# Patient Record
Sex: MALE | Race: WHITE | NOT HISPANIC OR LATINO | URBAN - METROPOLITAN AREA
[De-identification: names, ages, dates, MRNs, and addresses within clinical notes are randomized per-mention and may not be internally consistent; named-entity substitution may affect disease eponyms.]

---

## 2012-02-13 LAB
CHOLEST SERPL-MCNC: 135 MG/DL
HDLC SERPL-MCNC: 34 MG/DL
LDLC SERPL CALC-MCNC: 77 MG/DL
TRIGL SERPL-MCNC: 119 MG/DL

## 2017-01-01 ENCOUNTER — OFFICE VISIT - HEALTHEAST (OUTPATIENT)
Dept: GERIATRICS | Facility: CLINIC | Age: 82
End: 2017-01-01

## 2017-01-01 ENCOUNTER — AMBULATORY - HEALTHEAST (OUTPATIENT)
Dept: GERIATRICS | Facility: CLINIC | Age: 82
End: 2017-01-01

## 2017-01-01 DIAGNOSIS — I50.22 CHRONIC SYSTOLIC CONGESTIVE HEART FAILURE (H): ICD-10-CM

## 2017-01-01 DIAGNOSIS — R53.81 PHYSICAL DECONDITIONING: ICD-10-CM

## 2017-01-01 DIAGNOSIS — L20.9 ATOPIC DERMATITIS: ICD-10-CM

## 2017-01-01 DIAGNOSIS — R60.0 PEDAL EDEMA: ICD-10-CM

## 2017-01-01 DIAGNOSIS — K26.9 DUODENAL ULCER: ICD-10-CM

## 2017-01-01 DIAGNOSIS — N17.9 AKI (ACUTE KIDNEY INJURY) (H): ICD-10-CM

## 2017-01-01 DIAGNOSIS — K26.9 MULTIPLE DUODENAL ULCERS: ICD-10-CM

## 2017-01-01 DIAGNOSIS — G93.40 ENCEPHALOPATHY: ICD-10-CM

## 2017-01-01 DIAGNOSIS — R53.1 WEAKNESS: ICD-10-CM

## 2017-01-01 DIAGNOSIS — I48.0 PAROXYSMAL ATRIAL FIBRILLATION (H): ICD-10-CM

## 2017-01-01 DIAGNOSIS — M75.01 ADHESIVE CAPSULITIS OF RIGHT SHOULDER: ICD-10-CM

## 2017-01-03 ENCOUNTER — OFFICE VISIT - HEALTHEAST (OUTPATIENT)
Dept: GERIATRICS | Facility: CLINIC | Age: 82
End: 2017-01-03

## 2017-01-03 DIAGNOSIS — I48.0 PAROXYSMAL ATRIAL FIBRILLATION (H): ICD-10-CM

## 2017-01-03 DIAGNOSIS — K56.0 ADYNAMIC ILEUS (H): ICD-10-CM

## 2017-01-03 DIAGNOSIS — R19.7 DIARRHEA, UNSPECIFIED TYPE: ICD-10-CM

## 2017-01-03 DIAGNOSIS — R53.81 PHYSICAL DECONDITIONING: ICD-10-CM

## 2017-01-03 DIAGNOSIS — L20.9 ATOPIC DERMATITIS: ICD-10-CM

## 2017-01-05 ENCOUNTER — AMBULATORY - HEALTHEAST (OUTPATIENT)
Dept: GERIATRICS | Facility: CLINIC | Age: 82
End: 2017-01-05

## 2017-01-10 ENCOUNTER — OFFICE VISIT - HEALTHEAST (OUTPATIENT)
Dept: GERIATRICS | Facility: CLINIC | Age: 82
End: 2017-01-10

## 2017-01-10 DIAGNOSIS — R53.1 WEAKNESS: ICD-10-CM

## 2017-01-10 DIAGNOSIS — J10.1 INFLUENZA A: ICD-10-CM
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
## 2017-01-10 DIAGNOSIS — S50.311A ABRASION OF RIGHT ELBOW: ICD-10-CM

## 2017-01-10 DIAGNOSIS — I48.19 PERSISTENT ATRIAL FIBRILLATION (H): ICD-10-CM

## 2017-01-10 DIAGNOSIS — E83.42 HYPOMAGNESEMIA: ICD-10-CM

## 2017-01-10 DIAGNOSIS — R53.81 PHYSICAL DECONDITIONING: ICD-10-CM

## 2017-01-10 DIAGNOSIS — S50.312A ABRASION OF LEFT ELBOW: ICD-10-CM

## 2017-01-10 DIAGNOSIS — Z86.73 HISTORY OF CVA (CEREBROVASCULAR ACCIDENT): ICD-10-CM

## 2017-01-10 ASSESSMENT — MIFFLIN-ST. JEOR: SCORE: 1481.07

## 2017-01-13 ENCOUNTER — OFFICE VISIT - HEALTHEAST (OUTPATIENT)
Dept: GERIATRICS | Facility: CLINIC | Age: 82
End: 2017-01-13

## 2017-01-13 DIAGNOSIS — R53.1 WEAKNESS: ICD-10-CM

## 2017-01-13 DIAGNOSIS — S50.311D ABRASION OF RIGHT ELBOW, SUBSEQUENT ENCOUNTER: ICD-10-CM

## 2017-01-13 DIAGNOSIS — J10.1 INFLUENZA A: ICD-10-CM

## 2017-01-13 DIAGNOSIS — R63.0 POOR APPETITE: ICD-10-CM

## 2017-01-13 DIAGNOSIS — S50.312A ABRASION OF LEFT ELBOW: ICD-10-CM

## 2017-01-20 ENCOUNTER — OFFICE VISIT - HEALTHEAST (OUTPATIENT)
Dept: GERIATRICS | Facility: CLINIC | Age: 82
End: 2017-01-20

## 2017-01-20 DIAGNOSIS — S50.311D ABRASION OF RIGHT ELBOW, SUBSEQUENT ENCOUNTER: ICD-10-CM

## 2017-01-20 DIAGNOSIS — R53.81 PHYSICAL DECONDITIONING: ICD-10-CM

## 2017-01-20 DIAGNOSIS — J10.1 INFLUENZA A: ICD-10-CM

## 2017-01-24 ENCOUNTER — OFFICE VISIT - HEALTHEAST (OUTPATIENT)
Dept: GERIATRICS | Facility: CLINIC | Age: 82
End: 2017-01-24

## 2017-01-24 DIAGNOSIS — I48.0 PAROXYSMAL ATRIAL FIBRILLATION (H): ICD-10-CM

## 2017-01-24 DIAGNOSIS — I95.9 HYPOTENSION: ICD-10-CM

## 2017-01-24 DIAGNOSIS — S50.311D ABRASION OF RIGHT ELBOW, SUBSEQUENT ENCOUNTER: ICD-10-CM

## 2017-01-24 DIAGNOSIS — L20.9 ATOPIC DERMATITIS: ICD-10-CM

## 2017-01-24 DIAGNOSIS — R53.1 WEAKNESS: ICD-10-CM

## 2017-01-27 ENCOUNTER — OFFICE VISIT - HEALTHEAST (OUTPATIENT)
Dept: GERIATRICS | Facility: CLINIC | Age: 82
End: 2017-01-27

## 2017-01-27 DIAGNOSIS — I95.9 HYPOTENSION: ICD-10-CM

## 2017-01-27 DIAGNOSIS — I48.19 PERSISTENT ATRIAL FIBRILLATION (H): ICD-10-CM

## 2017-01-27 DIAGNOSIS — R00.0 TACHYCARDIA: ICD-10-CM

## 2017-01-31 ENCOUNTER — OFFICE VISIT - HEALTHEAST (OUTPATIENT)
Dept: GERIATRICS | Facility: CLINIC | Age: 82
End: 2017-01-31

## 2017-01-31 DIAGNOSIS — R53.1 WEAKNESS: ICD-10-CM

## 2017-01-31 DIAGNOSIS — S50.312S: ICD-10-CM

## 2017-01-31 DIAGNOSIS — R00.0 TACHYCARDIA: ICD-10-CM

## 2017-01-31 DIAGNOSIS — I95.9 HYPOTENSION: ICD-10-CM

## 2017-01-31 DIAGNOSIS — L20.9 ATOPIC DERMATITIS: ICD-10-CM

## 2017-01-31 DIAGNOSIS — I48.19 PERSISTENT ATRIAL FIBRILLATION (H): ICD-10-CM

## 2017-01-31 DIAGNOSIS — R53.81 PHYSICAL DECONDITIONING: ICD-10-CM

## 2017-01-31 DIAGNOSIS — S50.311D ABRASION OF RIGHT ELBOW, SUBSEQUENT ENCOUNTER: ICD-10-CM

## 2017-02-02 ENCOUNTER — AMBULATORY - HEALTHEAST (OUTPATIENT)
Dept: GERIATRICS | Facility: CLINIC | Age: 82
End: 2017-02-02

## 2017-08-11 ENCOUNTER — SURGERY - HEALTHEAST (OUTPATIENT)
Dept: GASTROENTEROLOGY | Facility: CLINIC | Age: 82
End: 2017-08-11

## 2017-08-11 ASSESSMENT — MIFFLIN-ST. JEOR
SCORE: 1455.92
SCORE: 1469.53

## 2017-08-13 ASSESSMENT — MIFFLIN-ST. JEOR: SCORE: 1441.41

## 2017-08-14 ASSESSMENT — MIFFLIN-ST. JEOR: SCORE: 1435.69

## 2018-01-01 ENCOUNTER — HOME CARE/HOSPICE - HEALTHEAST (OUTPATIENT)
Dept: HOSPICE | Facility: HOSPICE | Age: 83
End: 2018-01-01

## 2018-01-01 ENCOUNTER — AMBULATORY - HEALTHEAST (OUTPATIENT)
Dept: OTHER | Facility: CLINIC | Age: 83
End: 2018-01-01

## 2018-01-01 ENCOUNTER — AMBULATORY - HEALTHEAST (OUTPATIENT)
Dept: HOSPICE | Facility: HOSPICE | Age: 83
End: 2018-01-01

## 2018-01-01 ENCOUNTER — OFFICE VISIT - HEALTHEAST (OUTPATIENT)
Dept: GERIATRICS | Facility: CLINIC | Age: 83
End: 2018-01-01

## 2018-01-01 ENCOUNTER — AMBULATORY - HEALTHEAST (OUTPATIENT)
Dept: FAMILY MEDICINE | Facility: CLINIC | Age: 83
End: 2018-01-01

## 2018-01-01 DIAGNOSIS — N28.89 LEFT RENAL MASS: ICD-10-CM

## 2018-01-01 DIAGNOSIS — L20.9 ATOPIC DERMATITIS: ICD-10-CM

## 2018-01-01 DIAGNOSIS — N40.0 PROSTATIC HYPERTROPHY: ICD-10-CM

## 2018-01-01 DIAGNOSIS — R60.0 PEDAL EDEMA: ICD-10-CM

## 2018-01-01 DIAGNOSIS — G47.00 INSOMNIA: ICD-10-CM

## 2018-01-01 DIAGNOSIS — C64.2 RENAL CELL CARCINOMA OF LEFT KIDNEY METASTATIC TO OTHER SITE (H): ICD-10-CM

## 2018-01-01 DIAGNOSIS — I95.9 HYPOTENSION: ICD-10-CM

## 2018-01-01 DIAGNOSIS — I63.40 CEREBROVASCULAR ACCIDENT (CVA) DUE TO EMBOLISM OF CEREBRAL ARTERY (H): ICD-10-CM

## 2018-01-01 DIAGNOSIS — E46 PROTEIN CALORIE MALNUTRITION (H): ICD-10-CM

## 2018-01-01 DIAGNOSIS — R53.1 GENERALIZED WEAKNESS: ICD-10-CM

## 2018-01-01 DIAGNOSIS — G62.9 NEUROPATHY: ICD-10-CM

## 2018-01-01 DIAGNOSIS — L29.9 PRURITUS: ICD-10-CM

## 2018-01-01 DIAGNOSIS — N13.30 HYDRONEPHROSIS, LEFT: ICD-10-CM

## 2018-01-01 DIAGNOSIS — N28.89 RENAL MASS: ICD-10-CM

## 2018-01-01 DIAGNOSIS — R11.0 NAUSEA: ICD-10-CM

## 2018-01-01 DIAGNOSIS — Z51.5 HOSPICE CARE: ICD-10-CM

## 2018-01-01 DIAGNOSIS — E44.0 MODERATE PROTEIN-CALORIE MALNUTRITION (H): ICD-10-CM

## 2018-01-01 DIAGNOSIS — R31.9 HEMATURIA: ICD-10-CM

## 2018-01-01 DIAGNOSIS — I48.0 PAROXYSMAL ATRIAL FIBRILLATION (H): ICD-10-CM

## 2018-01-01 RX ORDER — ATROPINE SULFATE 10 MG/ML
2 SOLUTION/ DROPS OPHTHALMIC EVERY 4 HOURS
Status: SHIPPED | COMMUNITY
Start: 2018-01-01

## 2018-01-01 RX ORDER — HYDROMORPHONE HYDROCHLORIDE 1 MG/ML
2 SOLUTION ORAL
Status: SHIPPED | COMMUNITY
Start: 2018-01-01

## 2018-01-01 RX ORDER — ATROPINE SULFATE 10 MG/ML
2 SOLUTION/ DROPS OPHTHALMIC
Status: SHIPPED | COMMUNITY
Start: 2018-01-01

## 2018-01-01 RX ORDER — HALOPERIDOL 2 MG/ML
0.5 SOLUTION ORAL EVERY 4 HOURS PRN
Status: SHIPPED | COMMUNITY
Start: 2018-01-01

## 2018-01-01 RX ORDER — LORAZEPAM 2 MG/ML
0.5 CONCENTRATE ORAL EVERY 4 HOURS PRN
Status: SHIPPED | COMMUNITY
Start: 2018-01-01

## 2018-01-01 RX ORDER — LORAZEPAM 2 MG/ML
0.5 CONCENTRATE ORAL EVERY 4 HOURS
Status: SHIPPED | COMMUNITY
Start: 2018-01-01

## 2018-01-01 RX ORDER — HYDROMORPHONE HYDROCHLORIDE 1 MG/ML
2 SOLUTION ORAL EVERY 4 HOURS
Status: SHIPPED | COMMUNITY
Start: 2018-01-01

## 2018-01-01 RX ORDER — HALOPERIDOL 2 MG/ML
0.5 SOLUTION ORAL EVERY 4 HOURS
Status: SHIPPED | COMMUNITY
Start: 2018-01-01

## 2018-01-01 ASSESSMENT — MIFFLIN-ST. JEOR
SCORE: 1365.21
SCORE: 1455.02

## 2018-08-20 ENCOUNTER — AMBULATORY - HEALTHEAST (OUTPATIENT)
Dept: GERIATRICS | Facility: CLINIC | Age: 83
End: 2018-08-20

## 2021-05-30 VITALS — BODY MASS INDEX: 24.39 KG/M2 | WEIGHT: 174.9 LBS

## 2021-05-30 VITALS — BODY MASS INDEX: 24 KG/M2 | WEIGHT: 172 LBS

## 2021-05-30 VITALS — WEIGHT: 180.6 LBS | BODY MASS INDEX: 25.28 KG/M2 | HEIGHT: 71 IN

## 2021-05-30 VITALS — WEIGHT: 172.2 LBS | BODY MASS INDEX: 24.03 KG/M2

## 2021-05-30 VITALS — WEIGHT: 170.8 LBS | BODY MASS INDEX: 23.83 KG/M2

## 2021-05-30 VITALS — WEIGHT: 169.8 LBS | BODY MASS INDEX: 23.69 KG/M2

## 2021-05-30 VITALS — WEIGHT: 172.4 LBS | BODY MASS INDEX: 24.06 KG/M2

## 2021-05-31 VITALS — WEIGHT: 176.8 LBS | BODY MASS INDEX: 24.66 KG/M2

## 2021-05-31 VITALS — WEIGHT: 170.54 LBS | BODY MASS INDEX: 23.88 KG/M2 | HEIGHT: 71 IN

## 2021-06-01 VITALS — BODY MASS INDEX: 20.57 KG/M2 | WEIGHT: 155.9 LBS

## 2021-06-01 VITALS — BODY MASS INDEX: 21.51 KG/M2 | WEIGHT: 163 LBS

## 2021-06-01 VITALS — WEIGHT: 153.9 LBS | BODY MASS INDEX: 20.3 KG/M2

## 2021-06-01 VITALS — BODY MASS INDEX: 19.79 KG/M2 | WEIGHT: 150 LBS

## 2021-06-01 VITALS — WEIGHT: 152 LBS | BODY MASS INDEX: 20.05 KG/M2

## 2021-06-01 VITALS — BODY MASS INDEX: 21.7 KG/M2 | WEIGHT: 155 LBS | HEIGHT: 71 IN

## 2021-06-01 VITALS — BODY MASS INDEX: 19.92 KG/M2 | WEIGHT: 151 LBS

## 2021-06-01 VITALS — WEIGHT: 159 LBS | BODY MASS INDEX: 20.98 KG/M2

## 2021-06-01 VITALS — HEIGHT: 73 IN | BODY MASS INDEX: 22.24 KG/M2 | WEIGHT: 167.8 LBS

## 2021-06-01 VITALS — BODY MASS INDEX: 20.3 KG/M2 | WEIGHT: 153.9 LBS

## 2021-06-01 VITALS — BODY MASS INDEX: 20.98 KG/M2 | WEIGHT: 159 LBS

## 2021-06-01 VITALS — BODY MASS INDEX: 21.64 KG/M2 | WEIGHT: 164 LBS

## 2021-06-01 VITALS — WEIGHT: 154 LBS | BODY MASS INDEX: 20.32 KG/M2

## 2021-06-03 ENCOUNTER — RECORDS - HEALTHEAST (OUTPATIENT)
Dept: ADMINISTRATIVE | Facility: CLINIC | Age: 86
End: 2021-06-03

## 2021-06-08 NOTE — PROGRESS NOTES
Code Status:  FULL CODE  Visit Type: Discharge Summary     Facility:  [facility] [[id number]]          PCP:  Abhijeet Dale MD  [phone]       Admission Date to our Facility: January 8, 2017 from [facility]  Discharge Date from our Facility: February 1, 2017 to [facility]    Discharge Diagnosis:    1. Persistent atrial fibrillation     2. Hypotension     3. Tachycardia     4. Weakness     5. Atopic dermatitis     6. Physical deconditioning     7. Abrasion of right elbow, subsequent encounter     8. Abrasion of left elbow, sequela          History of Present Illness: Ethan Martin is a 89 y.o. male     Skilled Nursing Facility Course:  Patient came to us after a influenza exposure and a series of several admissions to the hospital than TCU then readmissions which started on December 26 through the 28th for weakness and diarrhea and then to [facility] for small bowel obstruction slightly before that the 21st through the 24th. He then went home with some physical deconditioning on January 4 from our facility and then fell and presented to [facility] where he was found to have influence and was quite weak.    Facility course patient initially was quite weak and slowly developed some improvement in ambulatory abilities but then had a fall and engendered to lacerations on his elbows left and right respectively. In addition to this on the 24th he was discovered to have persistent hypotension and because of this we reduced his rather Prill from 10 mg to 5 mg. He then had persistent low blood pressure and required holding  his metoprolol which was ultimately resumed the 25 mg BID. This plus the digoxin working together stabilized both his heart rate and his blood pressure. At the time of discharge she is ambulating independently and his been through therapy OT and PT for 4 to 5 days. He's in the process of moving tomorrow to [facility] for which he is quite  excited.    Discharge Medications:  Aspirin 81 mg Q daily Excedrin one Q8 PRN rarely uses, for migraine headache Lipitor 10 mg QHS for cholesterol Benadryl 25 at HS for sleep discontinue the cough syrup, digoxin 250  g one Q0 day, Flomax .4 mg Q day, metoprolol 25 BID, Prilosec 20 BID, DC the Reglan, triamcinolone 0.1% to rash to times a day, been a cream to ration arms topically BID. Mepilex dressings to his abrasion's Q day    Discharge Plan:  DC to [facility] February 1 with Gene set up. Follow up with Dr. Dale in two weeks. Discontinue Reglan and, quite often is in/dextromethorphan. Other meds as written. Obtain a BMP ditch level and lipid profile fasting in two weeks    Review of Systems     Physical Exam   Constitutional:   Patient in much better spirits ambulating independently clearly much stronger than prior. Blood pressure review has significantly improved with no blood pressure decreases below 120 since discontinuing the NANCY approach. Heart rates have also stabilized firmly in the 70s and 80s with no increases into the hundreds with the current regimen.   Pulmonary/Chest: Effort normal and breath sounds normal.   Abdominal: Soft.   Skin:   Patient examined on the elbows with the left having one on the radio side almost completely healed over and dry on the underside there's an area that's oblong approximately 2 cm x 3 cm that is open and slightly weepy clear clean with good margins. Right elbow has one on the inner aspect as well this one is larger proxy 3.5 x 3.5 and is slightly more weepy.   Nursing note and vitals reviewed.      Labs:   Lab Results   Component Value Date    CHOL 209 (H) 08/04/2016    CHOL 156 03/03/2016    CHOL 135 02/13/2012     Lab Results   Component Value Date    HDL 31 (L) 08/04/2016    HDL 39 (L) 03/03/2016    HDL 34 (L) 02/13/2012     Lab Results   Component Value Date    LDLCALC  08/04/2016      Comment:      Invalid, Triglycerides >400    LDLCALC 81 03/03/2016     LDLCALC 77 02/13/2012     Lab Results   Component Value Date    TRIG 441 (H) 08/04/2016    TRIG 178 (H) 03/03/2016    TRIG 285 (H) 09/15/2013     Results for orders placed or performed in visit on 01/09/17   Basic Metabolic Panel   Result Value Ref Range    Sodium 134 (L) 136 - 145 mmol/L    Potassium 4.1 3.5 - 5.0 mmol/L    Chloride 108 (H) 98 - 107 mmol/L    CO2 20 (L) 22 - 31 mmol/L    Anion Gap, Calculation 6 5 - 18 mmol/L    Glucose 93 70 - 125 mg/dL    Calcium 8.3 (L) 8.5 - 10.5 mg/dL    BUN 8 8 - 28 mg/dL    Creatinine 0.64 (L) 0.70 - 1.30 mg/dL    GFR MDRD Af Amer >60 >60 mL/min/1.73m2    GFR MDRD Non Af Amer >60 >60 mL/min/1.73m2       Lab Results   Component Value Date    DIGOXIN 0.4 (L) 12/21/2016           Assessment:  1. Persistent atrial fibrillation     2. Hypotension     3. Tachycardia     4. Weakness     5. Atopic dermatitis     6. Physical deconditioning     7. Abrasion of right elbow, subsequent encounter     8. Abrasion of left elbow, sequela                   45 minutes total time of which 65% was in face to face communication with patient about above plan of care.    Electronically signed by: Elio Chowdhury MD

## 2021-06-08 NOTE — PROGRESS NOTES
Code Status:  FULL CODE  Visit Type: Problem Visit (htn,tachy)     Facility:  [facility] [[id number]]        Facility Type: SNF (Skilled Nursing Facility, TCU)    History of Present Illness: Ethan Martin is a 89 y.o. male Who has a known history of atrial fibrillation, tachycardia, blood pressure irregularity. We had recently discontinued his NANCY appraisal with no on toward affect to his blood pressure. Unfortunately at times his heart rate has gone up. He went through. Where it was normal but our most recent finding indeed shows an uptick. He states that he doesn't think it's associated with anger anxiousness etc. He did feel a little weaker this morning and I little confused.    Review of Systems     Physical Exam   Cardiovascular:   Patient blood pressures are quite labile from 120s to 150s systolic. Additionally his heart rate is equally irregular and at times rapid despite digit and metoprolol.   Pulmonary/Chest: Effort normal and breath sounds normal.   Neurological:   He appears that his baseline for ambulatory activities and I clarified this with nursing as well   Psychiatric:   Appears slightly more confused this morning and then upset that we have to explain things several times for him to understand it.   Nursing note and vitals reviewed.      Labs:  All labs reviewed in the nursing home record.    Assessment:  1. Persistent atrial fibrillation     2. Hypotension     3. Tachycardia         Plan: We had a long discussion about this in her options. I do feel with his age that his blood pressure can float upTo systolic 150 without any undue harm and greater risk would be for it to be too low. However his heart rate ideally should be less than 90. We've introduced the digoxin and the Toprol and at this juncture the digoxin is at its Max dose. Because of all these things we are going to observe him with serial heart rates and blood pressures and if he continues to have tachycardia with  the blood pressure at least greater than 125 we will do a small dose increase on his metoprolol.      25 minutes spent of which greater than 65% was face to face communication with the patient about above plan of care    Electronically signed by: Elio Chowdhury MD

## 2021-06-08 NOTE — PROGRESS NOTES
Code Status:  FULL CODE  Visit Type: Problem Visit (htn,tacy,weakness)     Facility:  [facility] [[id number]]        Facility Type: SNF (Skilled Nursing Facility, TCU)    History of Present Illness: Ethan Martin is a 89 y.o. male Who is doing quite well in his rehabilitation. His ambulating without an assistive device in his gait speed has increased significantly. He is ready for the move to assisted living. He still is at times impulsive. He said no complaints. He does have numerous questions about whether he can maintain his own provider when he goes to our assisted living and what kind of backup services he would have. His daughter is hearing asks questions about his current medical regimen    Review of Systems     Physical Exam   Constitutional:   Patient clearly in good spirits and appears to be back to his normal baseline.   Cardiovascular: Normal rate.    Patient's blood pressure has been quite low some systolic's in the 80s and 90s and the most recent 105 to 110. Heart rate however is been well controlled in the 60s and 70s for the most part. He said no dizziness with this site. When I get him to sit up and listen he is not dizzy whatsoever.   Pulmonary/Chest: Effort normal and breath sounds normal.   Musculoskeletal: He exhibits no edema.   Nursing note and vitals reviewed.      Labs:  All labs reviewed in the nursing home record.    Assessment:  1. Weakness     2. Abrasion of right elbow, subsequent encounter     3. Paroxysmal atrial fibrillation     4. Atopic dermatitis     5. Hypotension         Plan: We carefully reviewed his medicines and discussed the protocol. When he goes to assisted living he certainly can continue with his regular provider. Additionally if you would request us we would be happy to see him as well. We reviewed his low blood pressure findings and we are going to reduce his RAMapril to 5 mg Q daily from 10 mg in additionally do BID BP and heart rates notifying  us of the systolic is lesson 110 greater than 160 in the heart rate is greater than 95 or less than 60.Make available with refills his skin creams      25 minutes spent of which greater than 65% was face to face communication with the patient about above plan of care    Electronically signed by: Elio Chowdhury MD

## 2021-06-08 NOTE — PROGRESS NOTES
Code Status:  DNR/DNI  Visit Type: Problem Visit (skin lacer,appetite,acp)     Facility:  [facility] [[id number]]        Facility Type: SNF (Skilled Nursing Facility, TCU)    History of Present Illness: Ethan Martin is a 89 y.o. male Who I was asked to see today to follow up on progress. This gentleman his hand in several recent re-admissions due to inability to be at home because of various problems. The most recent was developing influenza and profound weakness. Since being here he is definitely improved to walk 400 feet he's doing a little bit more on his own and as of today his attitude is certainly better to about his overall dispensation. His also been giving some deep thought into the future habitation and he has acquiesce to his children's request to consider assisted living. He is looking at our apartments here [facility]. He still feels that his appetite is not what it should be in his energy is still a little bit off. He feels weak even though he is accomplishing the tasks.    Review of Systems     Physical Exam   Constitutional:   Patient attitude appears much more upbeat he's alert interactive less confronting.Vital signs in review peer to be normal his weight is only drifted down to pounds.   Pulmonary/Chest: Effort normal and breath sounds normal.   Musculoskeletal: Normal range of motion. He exhibits no edema.   Neurological: He is alert.   Skin:   The laceration on his right inner elbow is draining much less and is smaller in size. The skin is now only about 3 cm round that is all open. Left side is significantly better and is dry. He still has very thin skin that easily bruises up.   Psychiatric:   [place] cognitive assessment 30/30   Vitals reviewed.      Labs:  All labs reviewed in the nursing home record.    Assessment:  1. Influenza A     2. Abrasion of right elbow, subsequent encounter     3. Abrasion of left elbow     4. Weakness     5. Poor appetite         Plan: We  had a long discussion on assisted living and on his significantly extraordinary [place] cognitive assessment score of 30/30. We discussed that sometimes because he's thinking so clearly that he gets impatient and that this is to his frustration. He acknowledged that significantly. For his lack of appetite we will introduce boost I one can BID for supplementation. He will continue to work with his family about looking into the assisted living. We do wanting to get significantly stronger prior to discharge. All this discussed.      25 minutes spent of which greater than 65% was face to face communication with the patient about above plan of care    Electronically signed by: Elio Chowdhury MD

## 2021-06-08 NOTE — PROGRESS NOTES
Code Status: FULL  Visit Type: Problem Visit (independent mobility)     Facility:  [facility] [[id number]]        Facility Type: SNF (Skilled Nursing Facility, TCU)    History of Present Illness: Ethan Martin is a 89 y.o. male     Review of Systems     Physical Exam   Constitutional:   Patient is in better spirits more interactive less negative more positive in his comments. Lungs are clear hard his regular rate there's no edema vital signs are normal.   Vitals reviewed.      Labs:  All labs reviewed in the nursing home record.    Assessment:  1. Influenza A     2. Abrasion of right elbow, subsequent encounter     3. Physical deconditioning         Plan: We visited about his changes that he's going to be going through and he seems to be ready for them. He would like to have some of his exercise devices over in the apartments and we will make arrangements for that to happen. Will also make sure that he can have available to him the right equipment to continue improving his balance and independence. No other medical changes at this time      15 minutes spent of which greater than 65% was face to face communication with the patient about above plan of care    Electronically signed by: Elio Chowdhury MD

## 2021-06-08 NOTE — PROGRESS NOTES
Code Status:  FULL CODE  Visit Type: Discharge Summary     Facility:  [facility] [[id number]]          PCP:  Abhijeet Dael MD  [phone]       Admission Date to our Facility: December 28, 2016 from [facility]  Discharge Date from our Facility: January 4, 2017 to home    Discharge Diagnosis:    1. Adynamic ileus     2. Paroxysmal atrial fibrillation     3. Diarrhea, unspecified type     4. Physical deconditioning     5. Atopic dermatitis          History of Present Illness: Ethan Martin is a 89 y.o. male     Skilled Nursing Facility Course:  Ethan is a soon to be 89-year-old that had difficulties with in a dynamic ileus and then ultimately diarrhea. He had weakness and then briefly with us no bowel movement whatsoever. He has a known large ventral hernia and as stated had bowel difficulties went home then had diarrhea and this was stabilized and he came to us in a deconditioned state.    Facility course; the patient initially did not have bowel moment and our peer and bowel agents were initiated and he ended up having regular bowel movements. His fitness and conditioning improved and at the time of discharge he was back to his baseline ambulatory abilities. He was able to do all of the activities of daily living from the occupational therapy standpoint including dressing himself grooming. From the physical therapy standpoint he was able to ambulate independently without difficulty. His bowel moments normalized and he had no discomfort. His atopic dermatitis was carefully managed as he does at home.    His children were concerned about him being completely independent in his home. He is on a single floor for all activities except his laundry. He is adamant that in several days you'll be able to easily  accommodate that problem. Further they would like him to have some home housekeeping and some Meals on Wheels which she is adamantly opposed to. At this time we feel that his  balance is near baseline and that we can safely release them to home. Are cautions are two in a non-confronting way assist him in getting services that he needs. Additionally Dr. Dale his primary care physician will discuss with him the continuation of the Flomax as well as the possibility of going on Coumadin for his intermittent atrial fibrillation.    Discharge Medications:  Patient may have have a glass of prune juice Q daily. We will have him reduce this S is stool comes back to normal. Additionally for the written buttocks Aquaphor BID till clear topically. Other medicines as before to include NANYC approval 10 mg Q day, triamcinolone 0.1% to chest arms hands daily, then a cream applied to chest BID, aspirin 80 1Q daily, Lipitor 10 mg HS, Benadryl 50 HS, digoxin 250  g Q daily, Flomax 0.4 mg HS, Reglan 5 mg TID PRN, metoprolol tartrate 25 BID, omeprazole 20 mg b.i.d., polyvinyl alcohol solution 1.4% one drop all you TID PRN    Discharge Plan:  Patient is discharged to home follow up with Dr. Dale. I called his adult son discussed non-confronting approach to getting more services in his home when he's willing.    Review of Systems     Physical Exam   Constitutional:   Per nursing slightly written buttocks with no full skin breakdown. Independently ambulating. Mini mental status exam 30/30.   Vitals reviewed.      Labs:   Recent Results (from the past 240 hour(s))   Basic Metabolic Panel   Result Value Ref Range    Sodium 136 136 - 145 mmol/L    Potassium 4.2 3.5 - 5.0 mmol/L    Chloride 107 98 - 107 mmol/L    CO2 22 22 - 31 mmol/L    Anion Gap, Calculation 7 5 - 18 mmol/L    Glucose 118 70 - 125 mg/dL    Calcium 9.0 8.5 - 10.5 mg/dL    BUN 12 8 - 28 mg/dL    Creatinine 0.68 (L) 0.70 - 1.30 mg/dL    GFR MDRD Af Amer >60 >60 mL/min/1.73m2    GFR MDRD Non Af Amer >60 >60 mL/min/1.73m2   Platelet Count   Result Value Ref Range    Platelets 170 140 - 440 thou/uL   Hemoglobin   Result Value Ref Range     Hemoglobin 12.0 (L) 14.0 - 18.0 g/dL   ECG 12 lead   Result Value Ref Range    SYSTOLIC BLOOD PRESSURE  mmHg    DIASTOLIC BLOOD PRESSURE  mmHg    VENTRICULAR RATE 102 BPM    ATRIAL RATE 96 BPM    P-R INTERVAL  ms    QRS DURATION 92 ms    Q-T INTERVAL 348 ms    QTC CALCULATION (BEZET) 453 ms    P Axis  degrees    R AXIS -41 degrees    T AXIS 50 degrees    MUSE DIAGNOSIS       Ectopic atrial rhythm with Mobitz I (Wenckebach) block  Premature ventricular complexes  Left axis deviation  Minimal voltage criteria for LVH, may be normal variant  Abnormal ECG  When compared with ECG of 21-DEC-2016 01:59,  ST no longer depressed in Anterior leads  QT has lengthened  Confirmed by JAIR RUSSELL, SAJAN LOC: ([id number]) on 12/27/2016 8:33:05 AM     Chest Pain Panel   Result Value Ref Range    Sodium 134 (L) 136 - 145 mmol/L    Potassium 4.0 3.5 - 5.0 mmol/L    Chloride 107 98 - 107 mmol/L    CO2 17 (L) 22 - 31 mmol/L    Glucose 81 70 - 125 mg/dL    BUN 18 8 - 28 mg/dL    Creatinine 1.06 0.70 - 1.30 mg/dL    GFR MDRD Af Amer >60 >60 mL/min/1.73m2    GFR MDRD Non Af Amer >60 >60 mL/min/1.73m2    Calcium 9.0 8.5 - 10.5 mg/dL    Troponin I 0.02 0.00 - 0.29 ng/mL    CK-MB 3 0 - 7 ng/mL   HEM 2 Without Diff   Result Value Ref Range    WBC 9.7 4.0 - 11.0 thou/uL    RBC 4.25 (L) 4.40 - 6.20 mill/uL    Hemoglobin 11.8 (L) 14.0 - 18.0 g/dL    Hematocrit 37.7 (L) 40.0 - 54.0 %    MCV 89 80 - 100 fL    MCH 27.8 27.0 - 34.0 pg    MCHC 31.3 (L) 32.0 - 36.0 g/dL    RDW 17.0 (H) 11.0 - 14.5 %    Platelets 192 140 - 440 thou/uL    MPV 11.5 8.5 - 12.5 fL   Protime-INR   Result Value Ref Range    INR 1.06 0.90 - 1.10   APTT   Result Value Ref Range    PTT 27 24 - 37 seconds   BNP(B-type Natriuretic Peptide)   Result Value Ref Range     (H) 0 - 93 pg/mL   Lactic Acid   Result Value Ref Range    Lactic Acid 2.3 (H) 0.5 - 2.2 mmol/L   Basic Metabolic Panel   Result Value Ref Range    Sodium 135 (L) 136 - 145 mmol/L    Potassium 4.0 3.5 - 5.0  mmol/L    Chloride 109 (H) 98 - 107 mmol/L    CO2 18 (L) 22 - 31 mmol/L    Anion Gap, Calculation 8 5 - 18 mmol/L    Glucose 87 70 - 125 mg/dL    Calcium 8.8 8.5 - 10.5 mg/dL    BUN 18 8 - 28 mg/dL    Creatinine 0.77 0.70 - 1.30 mg/dL    GFR MDRD Af Amer >60 >60 mL/min/1.73m2    GFR MDRD Non Af Amer >60 >60 mL/min/1.73m2   Lactic Acid   Result Value Ref Range    Lactic Acid 0.8 0.5 - 2.2 mmol/L   Culture, Stool   Result Value Ref Range    Culture       No Salmonella, Shigella, Yersinia, or Campylobacter.   C. Diff Toxin By PCR   Result Value Ref Range    C.Difficile Toxigenic by PCR Negative Negative   Fecal WBC's   Result Value Ref Range    Fecal WBCs 1+ Polymorphonuclear leukocytes (!) No Polymorphonuclear leukocytes seen   EnteroHaemorrhagic E. coli Work Up   Result Value Ref Range    Shiga Toxin 1 Negative Negative    Shiga Toxin 2 Negative Negative   Platelet Count - every other day x 3   Result Value Ref Range    Platelets 141 140 - 440 thou/uL     .    Assessment:  1. Adynamic ileus     2. Paroxysmal atrial fibrillation     3. Diarrhea, unspecified type     4. Physical deconditioning     5. Atopic dermatitis           DISCHARGE PLAN/FACE TO FACE:          45 minutes total time of which 85% was in face to face communication with patient about above plan of care.    Electronically signed by: Elio Chowdhury MD

## 2021-06-12 NOTE — PROGRESS NOTES
Code Status:  DNR  Visit Type: H&P     Facility:  [facility] [[id number]]      Facility Type: SNF (Skilled Nursing Facility, TCU)    History of Present Illness:   Hospital Admission Date: 8/14/17 Hospital Discharge Date: 8/19/17 ([facility])  Facility Admission Date: 8/19/17    Ethan Martin is a 89 y.o. male with history of atrial fibrillation, CVA, coronary disease, BPH, peptic ulcer disease, new diagnosis CHF (EF 30-35%) with recent hospitalization for hematemesis found to have several duodenal ulcers that were clipped.  He was doing well and was discharged to the TCU on 8/14/17.  Later that day he was found to be more confused with increasing oxygen requirements and was brought back to the ER.  There he was found to have an acute kidney injury and mild fevers without source of infection.  He was treated empirically with ceftriaxone which was discontinued after a few days when no source was identified.  His acute encephalopathy was felt to be in metabolic and had improved without intervention.  He was intermittently agitated and required a one-to-one sitter on and off.  He was recently placed on lisinopril for his new diagnosis CHF which was discontinued in the hospital.    He currently feels good and says physical therapy has been going well.  He did tell me that his right shoulder is been bothering him for about a month.  He has had increased pain and decreased range of motion.  He thinks he has a frozen shoulder.  He has been doing some physical therapy on it here which is been helpful.  He does not want any more interventions for it and is mainly focused on improving lower extremity strength and balance so that he can get back to his prior living arrangement. 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  He says noone has ever formally diagnosed the cause, but he thinks it was from his former occupation at [employer].    Past Medical History:    Diagnosis Date     Altered mental state      Bowel obstruction      CVA (cerebral infarction)      Dermatitis     [place] following this condition     Gastric outlet obstruction      GERD (gastroesophageal reflux disease)      Hematemesis      Hernia      HLD (hyperlipidemia)      HTN (hypertension)      Influenza      Migraine      Neuropathy      Normocytic anemia      Paroxysmal atrial fibrillation      Peptic ulcer disease      Psoriasis      Pyloric stenosis      Stroke      Past Surgical History:   Procedure Laterality Date     BACK SURGERY       CHOLECYSTECTOMY       ESOPHAGOGASTRODUODENOSCOPY N/A 6/24/2015    Procedure: ESOPHAGOGASTRODUODENOSCOPY with biopsy and esophageal dilitation;  Surgeon: Yohannes Lawton MD;  Location: [facility];  Service:      ESOPHAGOGASTRODUODENOSCOPY N/A 8/11/2017    Procedure: ESOPHAGOGASTRODUODENOSCOPY (EGD) WITH BIOPSIES;  Surgeon: Pankaj Goel MD;  Location: [facility];  Service:      PYLOROPLASTY       Family History   Problem Relation Age of Onset     No Medical Problems Mother      No Medical Problems Father      Social History     Social History     Marital status:      Spouse name: N/A     Number of children: N/A     Years of education: N/A     Occupational History     Not on file.     Social History Main Topics     Smoking status: Never Smoker     Smokeless tobacco: Never Used     Alcohol use No     Drug use: No     Sexual activity: Not Currently     Other Topics Concern     Not on file     Social History Narrative     Additional Geriatric Review:  15 years ago. Has 4 children all in town whom he has good relationships with.  No issues with hearing/vision.  No recent falls.    Current Outpatient Prescriptions   Medication Sig Dispense Refill     acetaminophen (TYLENOL) 325 MG tablet Take 2 tablets (650 mg total) by mouth every 4 (four) hours as needed (Pain 1-3 or fever). (Patient taking differently: Take 650 mg by mouth every 4 (four) hours as  needed (Pain 1-3 or fever). )  0     atorvastatin (LIPITOR) 10 MG tablet Take 10 mg by mouth at bedtime.        digoxin (LANOXIN) 250 mcg tablet Take 250 mcg by mouth every other day. Every OTHER day.       emollient (EMOLLIENT) Crea Apply 1 application topically 3 (three) times a day as needed (dry skin). (Patient taking differently: Apply 1 application topically 3 (three) times a day as needed (dry skin). )  0     metoprolol tartrate (LOPRESSOR) 25 MG tablet Take 1 tablet (25 mg total) by mouth 2 (two) times a day. (Patient taking differently: Take 25 mg by mouth 2 (two) times a day. ) 60 tablet 1     omeprazole (PRILOSEC) 20 MG capsule Take 20 mg by mouth 2 (two) times a day.        polyvinyl alcohol (LIQUIFILM TEARS) 1.4 % ophthalmic solution Apply 1 drop to eye as needed for dry eyes.        tamsulosin (FLOMAX) 0.4 mg Cp24 Take 1 capsule (0.4 mg total) by mouth daily after supper. (Patient taking differently: Take 0.4 mg by mouth daily after supper. ) 30 capsule 0     triamcinolone (KENALOG) 0.1 % cream Apply topically 2 (two) times a day as needed. Do not use for more than 7 days at a time.       No current facility-administered medications for this visit.      No Known Allergies    There is no immunization history on file for this patient.      Review of Systems   10 pt negative unless noted above.    Physical Exam    V: 107/70 P 70 RR 20 T 98.2    General: Adult Male sitting in chair, NAD  HEENT: Atraumatic, PERRL  Neck: Supple  CV: irreg irreg, no m/r/g  Pulm: CTAB, no wheezing, rhonchi or crackles  Abdm: Soft, NT, ND, +bowel sounds  Ext: Warm, no edema, no erythema.  Right shoulder: unable to lift above 90 degrees, pain with passive ROM in all directions.  Neuro: AOx3, no gross deficits  Psych: Calm, normal affect    Labs:  All labs reviewed in the nursing home record.    Assessment:  1. Encephalopathy     2. Physical deconditioning     3. LAUREN (acute kidney injury)     4. Chronic systolic congestive heart  failure     5. Duodenal ulcer     6. Adhesive capsulitis of right shoulder       Plan: Doing much better since discharge after being readmitted with altered mental status felt to be most likely due to metabolic causes.  His LAUREN had resolved on previous check.  This was treated with IV fluids and his lisinopril that was recently added for heart failure was stopped.  Will plan on rechecking a BMP today.  His blood pressure is borderline low so we will hold off on resuming lisinopril.  Could consider lower dose or alternative ACE inhibitor if he remains low.    As for his duodenal ulcers, he seems to be doing well.  We will make no changes to his medication regimen.  We will check a hemoglobin as previously recommended.    He likely does have adhesive capsulitis involving his right shoulder.  Increased range of motion and pain on exam.  He wants to continue physical therapy for this.  Did not discuss consideration of joint injection, but could attempt this at a later time if he was agreeable.      Discussed with Dr. Chowdhury.  Patient is familiar to me chart examined discussed with 30 family practice resident Dr. Rigo Dillon patient examined and I agree with his assessment and plan.  45 minutes spent of which 55% was in face-to-face communication with the patient about above plan of care. MD Rigo Godinez, DO PGY3  [facility]

## 2021-06-12 NOTE — PROGRESS NOTES
Code Status:  FULL CODE  Visit Type: Problem Visit (CHF, peptic ulcer disease)     Facility:  [facility] [[id number]]        Facility Type: SNF (Skilled Nursing Facility, TCU)    History of Present Illness: Ethan Martin is a 89 y.o. male had a recent hospitalization with discovery of new CHF as well as some hematemesis and some duodenal ulcers that were recently clipped.  He has done fabulously well with more independence ambulating without assistive device and demonstrating although in a creative manner that he can do all of his daily ADLs.  Is quite keen on going home.  Therapy was projecting 1 more week for his deconditioning.  Eyes any shortness of breath denies any significant swelling.    Review of Systems     Physical Exam   Constitutional:   Vital signs have been stable he does have a little bit worsening pedal edema right greater than left.  However he is ambulating with great dexterity and good gait speed.  He is wearing his chest brace for his chronic pruritic lesions.  These have flared but he has his cream that he is using for these.   Vitals reviewed.      Labs:  All labs reviewed in the nursing home record.  Recent Results (from the past 240 hour(s))   Basic Metabolic Panel   Result Value Ref Range    Sodium 136 136 - 145 mmol/L    Potassium 4.3 3.5 - 5.0 mmol/L    Chloride 108 (H) 98 - 107 mmol/L    CO2 22 22 - 31 mmol/L    Anion Gap, Calculation 6 5 - 18 mmol/L    Glucose 93 70 - 125 mg/dL    Calcium 9.0 8.5 - 10.5 mg/dL    BUN 31 (H) 8 - 28 mg/dL    Creatinine 1.15 0.70 - 1.30 mg/dL    GFR MDRD Af Amer >60 >60 mL/min/1.73m2    GFR MDRD Non Af Amer 60 (L) >60 mL/min/1.73m2   Magnesium   Result Value Ref Range    Magnesium 1.8 1.8 - 2.6 mg/dL   HM2(CBC W/O DIFF)   Result Value Ref Range    WBC 7.4 4.0 - 11.0 thou/uL    RBC 4.22 (L) 4.40 - 6.20 mill/uL    Hemoglobin 11.7 (L) 14.0 - 18.0 g/dL    Hematocrit 37.0 (L) 40.0 - 54.0 %    MCV 88 80 - 100 fL    MCH 27.7 27.0 - 34.0 pg     MCHC 31.6 (L) 32.0 - 36.0 g/dL    RDW 18.9 (H) 11.0 - 14.5 %    Platelets 168 140 - 440 thou/uL    MPV 11.5 8.5 - 12.5 fL   Comprehensive Metabolic Panel   Result Value Ref Range    Sodium 135 (L) 136 - 145 mmol/L    Potassium 4.2 3.5 - 5.0 mmol/L    Chloride 108 (H) 98 - 107 mmol/L    CO2 18 (L) 22 - 31 mmol/L    Anion Gap, Calculation 9 5 - 18 mmol/L    Glucose 86 70 - 125 mg/dL    BUN 19 8 - 28 mg/dL    Creatinine 0.91 0.70 - 1.30 mg/dL    GFR MDRD Af Amer >60 >60 mL/min/1.73m2    GFR MDRD Non Af Amer >60 >60 mL/min/1.73m2    Bilirubin, Total 0.9 0.0 - 1.0 mg/dL    Calcium 9.2 8.5 - 10.5 mg/dL    Protein, Total 6.0 6.0 - 8.0 g/dL    Albumin 3.1 (L) 3.5 - 5.0 g/dL    Alkaline Phosphatase 52 45 - 120 U/L    AST 32 0 - 40 U/L    ALT 19 0 - 45 U/L   Magnesium   Result Value Ref Range    Magnesium 1.8 1.8 - 2.6 mg/dL   Digoxin (Lanoxin )   Result Value Ref Range    Digoxin 0.5 0.5 - 2.0 ng/mL   C. difficile Toxigenic by PCR   Result Value Ref Range    C.Difficile Toxigenic by PCR Negative Negative   Basic Metabolic Panel   Result Value Ref Range    Sodium 141 136 - 145 mmol/L    Potassium 3.8 3.5 - 5.0 mmol/L    Chloride 109 (H) 98 - 107 mmol/L    CO2 22 22 - 31 mmol/L    Anion Gap, Calculation 10 5 - 18 mmol/L    Glucose 76 70 - 125 mg/dL    Calcium 9.1 8.5 - 10.5 mg/dL    BUN 12 8 - 28 mg/dL    Creatinine 0.84 0.70 - 1.30 mg/dL    GFR MDRD Af Amer >60 >60 mL/min/1.73m2    GFR MDRD Non Af Amer >60 >60 mL/min/1.73m2   HM1 (CBC with Diff)   Result Value Ref Range    WBC 8.8 4.0 - 11.0 thou/uL    RBC 4.17 (L) 4.40 - 6.20 mill/uL    Hemoglobin 11.5 (L) 14.0 - 18.0 g/dL    Hematocrit 37.1 (L) 40.0 - 54.0 %    MCV 89 80 - 100 fL    MCH 27.6 27.0 - 34.0 pg    MCHC 31.0 (L) 32.0 - 36.0 g/dL    RDW 18.9 (H) 11.0 - 14.5 %    Platelets 185 140 - 440 thou/uL    MPV 12.2 8.5 - 12.5 fL    Neutrophils % 74 (H) 50 - 70 %    Lymphocytes % 16 (L) 20 - 40 %    Monocytes % 8 2 - 10 %    Eosinophils % 2 0 - 6 %    Basophils % 1 0 - 2 %     Neutrophils Absolute 6.4 2.0 - 7.7 thou/uL    Lymphocytes Absolute 1.4 0.8 - 4.4 thou/uL    Monocytes Absolute 0.7 0.0 - 0.9 thou/uL    Eosinophils Absolute 0.2 0.0 - 0.4 thou/uL    Basophils Absolute 0.0 0.0 - 0.2 thou/uL   Basic Metabolic Panel   Result Value Ref Range    Sodium 139 136 - 145 mmol/L    Potassium 3.8 3.5 - 5.0 mmol/L    Chloride 107 98 - 107 mmol/L    CO2 24 22 - 31 mmol/L    Anion Gap, Calculation 8 5 - 18 mmol/L    Glucose 82 70 - 125 mg/dL    Calcium 9.1 8.5 - 10.5 mg/dL    BUN 11 8 - 28 mg/dL    Creatinine 0.80 0.70 - 1.30 mg/dL    GFR MDRD Af Amer >60 >60 mL/min/1.73m2    GFR MDRD Non Af Amer >60 >60 mL/min/1.73m2         Assessment:  1. Physical deconditioning     2. Chronic systolic congestive heart failure     3. Multiple duodenal ulcers     4. Atopic dermatitis         Plan: We will after long discussion come to an agreement that he will look for discharge on Tuesday morning.  We will attempt to get therapy rolling in the weekend as well for his continued conditioning.  I reassured him that all his laboratory was normal.  We will keep an eye on his weights and his swelling and make adjustments as necessary.      25 minutes spent of which greater than 65% was face to face communication with the patient about above plan of care    Electronically signed by: Elio Chowdhury MD

## 2021-06-12 NOTE — PROGRESS NOTES
Code Status:  DNR  Visit Type: Discharge Summary     Facility:  [facility] [[id number]]          PCP:  Abhijeet Dale MD  [phone]       Admission Date to our Facility: August 19, 2017  Discharge Date from our Facility: August 29, 2017    Discharge Diagnosis:    1. Chronic systolic congestive heart failure     2. Paroxysmal atrial fibrillation     3. Duodenal ulcer     4. Weakness     5. Pedal edema     6. Atopic dermatitis          History of Present Illness: Ethan Martin is a 89 y.o. male     Skilled Nursing Facility Course: Patient had a double admission first for some CHF and exacerbation of that then came to us briefly and had hematemesis and went back to the hospital with the discovery of duodenal ulcers which was managed.  Returning to us in Prozac twice daily.  He had a brief period of confusion with that.    Facility course he rather quickly in recovery coming back to a second time improved and was able to reach his previous ambulatory status.  He does have ongoing pedal edema which she has had for some time and his weight did fluctuate somewhat and from previous stays at our facility he is more weight.  Most of his blood pressures and heart rates were within therapeutic range however at discharge his blood pressure was slightly low as was his heart rate.  This was discussed.  Because the majority were within normal limits I am not going to make big adjustments on his medicines at this time.  He is going home to assisted living in our building.    Discharge Medications: Lipitor 10 mg nightly, digoxin 0.25 every other day, metoprolol 25 twice daily with his previous dose but now that we here is not going to do mid set up we are going to reduce this to 25 mg one half twice daily and if he can do his own blood pressure and heart rate monitoring to coordinate this with his primary care, omeprazole 20 mg twice daily, polyvinyl alcohol solution 1.4% 1 drop both eyes daily for dry eyes,  Tamil loose and 0.4 mg daily, triamcinolone cream 0.1% twice daily for his atopic dermatitis, Tylenol 325 every 4 hours.  Would hold his metoprolol tartrate if systolic is <105 and heart rate is less than 55    For most current and accurate medication list, please contact the [facility] nursing facility that this patient visit took place at.      Discharge Plan: Patient is to discharge home will be going to the [facility] assisted living he will get help with meds set up meds as written follow-up with primary care in 2 weeks.  Would also request that he holds his metoprolol if systolic is less than 105 and heart rate less than 55    Review of Systems     Physical Exam   Constitutional:   He has clearly returned to his normal ambulatory ability steadier on his feet less dizzy.  He does have 2+ pitting edema but this is been his baseline.  He refuses to wear PEACE hose.   Cardiovascular:   Heart rate is a slow today but appears regular.   Vitals reviewed.      Labs:  All labs reviewed in the nursing home record.    Assessment:  1. Chronic systolic congestive heart failure     2. Paroxysmal atrial fibrillation     3. Duodenal ulcer     4. Weakness     5. Pedal edema     6. Atopic dermatitis         MEDICAL EQUIPMENT NEEDS: None      DISCHARGE PLAN/FACE TO FACE:  I certify that services are/were furnished while this patient was under the care of a physician and that a physician or an allowed non-physician practitioner (NPP), had a face-to-face encounter that meets the physician face-to-face encounter requirements. The encounter was in whole, or in part, related to the primary reason for home health. The patient is confined to his/her home and needs intermittent skilled nursing, physical therapy, speech-language pathology, or the continued need for occupational therapy. A plan of care has been established by a physician and is periodically reviewed by a physician.    I certify that this patient is under my care and that I, or a  nurse practitioner or physician's assistant working with me, had a face-to-face encounter that meets the physician face-to-face encounter requirements with this patient.   Date of Face-to-Face Encounter: August 29, 2017        45 minutes total time of which 65% was in face to face communication with patient about above plan of care.    Electronically signed by: Elio Chowdhury MD
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
## 2021-06-15 PROBLEM — J11.1 INFLUENZA: Status: ACTIVE | Noted: 2017-01-05

## 2021-06-15 PROBLEM — R53.1 GENERALIZED WEAKNESS: Status: ACTIVE | Noted: 2017-01-05

## 2021-06-15 PROBLEM — Z86.79 HISTORY OF CEREBROVASCULAR DISEASE: Chronic | Status: ACTIVE | Noted: 2017-01-05

## 2021-06-15 PROBLEM — S50.319A ELBOW ABRASION: Status: ACTIVE | Noted: 2017-01-05

## 2021-06-16 PROBLEM — I50.22 CHRONIC SYSTOLIC CONGESTIVE HEART FAILURE (H): Status: ACTIVE | Noted: 2017-01-01

## 2021-06-16 PROBLEM — K57.30 DIVERTICULOSIS OF LARGE INTESTINE WITHOUT HEMORRHAGE: Status: ACTIVE | Noted: 2017-08-14

## 2021-06-16 PROBLEM — R11.0 NAUSEA: Status: ACTIVE | Noted: 2018-01-01

## 2021-06-16 PROBLEM — I42.9 CARDIOMYOPATHY, UNSPECIFIED TYPE (H): Status: ACTIVE | Noted: 2017-08-14

## 2021-06-16 PROBLEM — Z51.5 PALLIATIVE CARE ENCOUNTER: Status: ACTIVE | Noted: 2018-01-01

## 2021-06-16 PROBLEM — L20.9 ATOPIC DERMATITIS: Status: ACTIVE | Noted: 2018-01-01

## 2021-06-16 PROBLEM — N28.9 RENAL INSUFFICIENCY, MILD: Status: ACTIVE | Noted: 2017-08-15

## 2021-06-16 PROBLEM — N28.89 RENAL MASS: Status: ACTIVE | Noted: 2018-01-01

## 2021-06-16 PROBLEM — N28.89 LEFT RENAL MASS: Status: ACTIVE | Noted: 2018-01-01

## 2021-06-16 PROBLEM — K43.9 VENTRAL HERNIA WITHOUT OBSTRUCTION OR GANGRENE: Status: ACTIVE | Noted: 2017-08-14

## 2021-06-16 PROBLEM — K80.50 COMMON BILE DUCT STONE: Status: ACTIVE | Noted: 2017-08-14

## 2021-06-16 PROBLEM — R41.82 ALTERED MENTAL STATUS: Status: ACTIVE | Noted: 2017-08-14

## 2021-06-16 PROBLEM — K92.2 GI BLEED: Status: ACTIVE | Noted: 2017-08-11

## 2021-06-18 NOTE — PROGRESS NOTES
Code Status:  DNR/DNI  Visit Type: Problem Visit (Pruritus/insomnia)     Facility:  [facility] [[id number]]        Facility Type:  (Long Term Care, LTC)    History of Present Illness: Ethan Martin is a 89 y.o. male who is a hospice patient who unfortunately had a mixup on his Benadryl and Ativan dosing.  This was rectified yesterday and he actually had a very good sleep.  In addition he has had almost no itching at all since the Kenalog shot.  He reports being in good spirits and really being pain-free at this time.  He was joking with the nurses about having all the patient's go to the shower in a car wash manner just in their wheelchairs.  They were both laughing quite loudly    Review of Systems     Physical Exam   Constitutional:   Clearly in good spirits.  Does not appear weekend.  Heart is regular rate and rhythm his weight has been stable.  No evidence of prominent rash.   Vitals reviewed.      Labs:  All labs reviewed in the nursing home record.    Assessment:  1. Atopic dermatitis     2. Left renal mass     3. Insomnia         Plan: He asked me if his itching came back could we give another Kenalog shot.  I do feel that we could do that ideally we would want to wait 4 weeks time.  However depending on how fast his cancer progresses we could consider a different route if he was miserable from the itching.  At this time we have the right amount of Ativan and Benadryl and I am not going to adjust that.  I have never seen him in such good spirits and I would like to continue to ride that we have at this time.      25 minutes spent of which greater than 65% was face to face communication with the patient about above plan of care    Electronically signed by: Elio Chowdhury MD

## 2021-06-18 NOTE — PROGRESS NOTES
Code Status:  DNR/DNI  Visit Type: H & P     Facility:  ANSWER ROOMING ACTIVITY QUESTION      Facility Type: NF (Long Term Care, LTC)    History of Present Illness:   Hospital Admission Date: May 19, 2018 [facility] hospital Discharge Date: May 23, 2018  Facility Admission Date: May 23, 2018 [facility]    Ethan Martin is a 89 y.o. male well-known to me from a previous admission in August 2017 where he had primarily cardiac issues with chronic systolic congestive heart failure and atrial fibrillation associated with weakness and pedal edema.  He was brought in to the emergency department this time by his family because of progressive weakness.  In addition he has had a poor appetite and may have had some mild abdominal distention and mild abdominal pain.    Hospital course the patient was evaluated and found to have a large renal mass with metastasis that was felt to be not resectable.  It involved hydronephrosis imaging results showed large exophytic medial left renal cysts up to 12 cm, left pelvic nodule and lymph node measurement enlarged 20 x 26 mm and marketed prostatic megaly.  There is also a severe sigmoid predominant diverticulosis.  These findings are compatible with malignancy and metastatic disease.  There is diffuse conglomerate retroperitoneal adenopathy with extension into the lower chest.  There is additional metastatic extension into the left pelvis.  Portions of the left adrenal are also encased in a masslike thickening.  Based on all of the above they have signed up for hospice services.    Past Medical History:   Diagnosis Date     Altered mental state      Bowel obstruction      CVA (cerebral infarction)      Dermatitis     [place] following this condition     Gastric outlet obstruction      GERD (gastroesophageal reflux disease)      Hematemesis      Hernia      HLD (hyperlipidemia)      HTN (hypertension)      Influenza      Migraine      Neuropathy      Normocytic  "anemia      Paroxysmal atrial fibrillation      Peptic ulcer disease      Psoriasis      Pyloric stenosis      Stroke      Past Surgical History:   Procedure Laterality Date     BACK SURGERY       CHOLECYSTECTOMY       ESOPHAGOGASTRODUODENOSCOPY N/A 6/24/2015    Procedure: ESOPHAGOGASTRODUODENOSCOPY with biopsy and esophageal dilitation;  Surgeon: Yohannes Lawton MD;  Location: [facility];  Service:      ESOPHAGOGASTRODUODENOSCOPY N/A 8/11/2017    Procedure: ESOPHAGOGASTRODUODENOSCOPY (EGD) WITH BIOPSIES;  Surgeon: Pankaj Goel MD;  Location: [facility];  Service:      PYLOROPLASTY       Family History   Problem Relation Age of Onset     No Medical Problems Mother      No Medical Problems Father      Social History     Social History     Marital status:      Spouse name: N/A     Number of children: N/A     Years of education: N/A     Occupational History     Not on file.     Social History Main Topics     Smoking status: Never Smoker     Smokeless tobacco: Never Used     Alcohol use No      Comment: one \"hi-ball\" a month     Drug use: No     Sexual activity: Not Currently     Other Topics Concern     Not on file     Social History Narrative     Additional Geriatric Review patient has 1 daughter and 3 sons.  He was an  at [employer].  He gave up driving in 2011.  He has had progressive weakness.  Current Outpatient Prescriptions   Medication Sig Dispense Refill     acetaminophen (TYLENOL) 325 MG tablet Take 2 tablets (650 mg total) by mouth every 4 (four) hours as needed (Pain 1-3 or fever).  0     artificial tear,dxtrn-hpm-gly, (GENTEAL TEARS, DXTRN-HPM-GLY,) 0.1-0.3-0.2 % Drop ophthalmic drops Administer 1 drop to both eyes 4 (four) times a day as needed.  0     atropine 1 % ophthalmic solution 1-2 drops orally or sublingually every 4 hours as needed for secretions. 1.25 mL 0     bisacodyl (DULCOLAX) 10 mg suppository Remove foil and insert 1 suppository daily, rectally, as needed for bowel " movement. 1 suppository 0     digoxin (LANOXIN) 250 mcg tablet Take 250 mcg by mouth every other day. Every OTHER day.       diphenhydrAMINE (BENADRYL) 25 mg tablet Take 25 mg by mouth at bedtime.       haloperidol (HALDOL) 0.5 mg solutab Place 1 tablet (0.5 mg total) under the tongue every 4 (four) hours as needed for agitation, anxiety, nausea or vomiting (for nausea or delirium (may give orally or sublingually)). 5 tablet 0     HYDROmorphone (DILAUDID) 0.5 MG solutab Place 1 tablet (0.5 mg total) under the tongue every 4 (four) hours as needed for pain or dyspnea (for pain, shortness of breath, or as directed by hospice nurse. May give orally or sublingually.). 5 tablet 0     LORazepam (ATIVAN) 0.5 MG tablet Take 0.5 mg by mouth every 4 (four) hours as needed (anxiety). per NANCY kit instructions  Indications: anxiety       metoprolol tartrate (LOPRESSOR) 25 MG tablet Take 12.5 mg by mouth 2 (two) times a day. (half a tablet = 12.5 mg)       omeprazole (PRILOSEC) 20 MG capsule Take 20 mg by mouth 2 (two) times a day.        tamsulosin (FLOMAX) 0.4 mg Cp24 Take 1 capsule (0.4 mg total) by mouth daily after supper. 30 capsule 0     triamcinolone (KENALOG) 0.1 % cream Apply topically 2 (two) times a day as needed. Do not use for more than 7 days at a time.       No current facility-administered medications for this visit.      No Known Allergies    There is no immunization history on file for this patient.      Review of Systems   Constitutional: Negative for activity change, chills, diaphoresis, fatigue and fever.   HENT: Negative for ear pain, hearing loss, sinus pressure, sore throat and trouble swallowing.    Eyes: Negative for discharge and visual disturbance.   Respiratory: Negative for cough, shortness of breath and wheezing.    Cardiovascular: Negative for chest pain, palpitations and leg swelling.   Gastrointestinal: Negative for abdominal pain, constipation and diarrhea.   Endocrine: Negative for cold  intolerance and heat intolerance.   Genitourinary: Negative for difficulty urinating, dysuria, flank pain, frequency and urgency.   Musculoskeletal: Negative for arthralgias, back pain, myalgias and neck stiffness.   Allergic/Immunologic: Negative for immunocompromised state.   Neurological: Negative for dizziness, tremors, syncope, speech difficulty, weakness, light-headedness, numbness and headaches.        Physical Exam   Constitutional: He is oriented to person, place, and time.   Appears slightly cachectic from previous visitations last year   HENT:   Head: Normocephalic and atraumatic.   Right Ear: External ear normal.   Left Ear: External ear normal.   Nose: Nose normal.   Mouth/Throat: Oropharynx is clear and moist.   Eyes: Conjunctivae and EOM are normal. Pupils are equal, round, and reactive to light. Right eye exhibits no discharge. Left eye exhibits no discharge.   Neck: Neck supple. No JVD present. No tracheal deviation present. No thyromegaly present.   Cardiovascular: Normal rate, regular rhythm, normal heart sounds and intact distal pulses.    No murmur heard.  Pulmonary/Chest: Effort normal and breath sounds normal. No respiratory distress. He has no wheezes. He has no rales. He exhibits no tenderness.   Abdominal: Soft. He exhibits no distension and no mass. There is no tenderness. There is no guarding.   Musculoskeletal: Normal range of motion. He exhibits no edema or tenderness.   Lymphadenopathy:     He has no cervical adenopathy.   Neurological: He is alert and oriented to person, place, and time. He has normal reflexes. No cranial nerve deficit. Coordination normal.   Not is able to ambulate as well as before.  Does so slowly with a cane   Skin: Skin is warm and dry. No rash noted. No erythema.   Psychiatric: He has a normal mood and affect. His behavior is normal. Judgment and thought content normal.   Nursing note and vitals reviewed.        Labs:  All labs reviewed in the nursing home  record.  Recent Results (from the past 240 hour(s))   Comprehensive Metabolic Panel   Result Value Ref Range    Sodium 131 (L) 136 - 145 mmol/L    Potassium 4.4 3.5 - 5.0 mmol/L    Chloride 97 (L) 98 - 107 mmol/L    CO2 20 (L) 22 - 31 mmol/L    Anion Gap, Calculation 14 5 - 18 mmol/L    Glucose 92 70 - 125 mg/dL    BUN 14 8 - 28 mg/dL    Creatinine 0.84 0.70 - 1.30 mg/dL    GFR MDRD Af Amer >60 >60 mL/min/1.73m2    GFR MDRD Non Af Amer >60 >60 mL/min/1.73m2    Bilirubin, Total 1.2 (H) 0.0 - 1.0 mg/dL    Calcium 9.3 8.5 - 10.5 mg/dL    Protein, Total 6.0 6.0 - 8.0 g/dL    Albumin 2.9 (L) 3.5 - 5.0 g/dL    Alkaline Phosphatase 68 45 - 120 U/L    AST 17 0 - 40 U/L    ALT <9 0 - 45 U/L   HM2 (CBC W/O DIFF)   Result Value Ref Range    WBC 5.9 4.0 - 11.0 thou/uL    RBC 3.83 (L) 4.40 - 6.20 mill/uL    Hemoglobin 10.8 (L) 14.0 - 18.0 g/dL    Hematocrit 33.3 (L) 40.0 - 54.0 %    MCV 87 80 - 100 fL    MCH 28.2 27.0 - 34.0 pg    MCHC 32.4 32.0 - 36.0 g/dL    RDW 17.7 (H) 11.0 - 14.5 %    Platelets 205 140 - 440 thou/uL    MPV 10.8 8.5 - 12.5 fL   Magnesium   Result Value Ref Range    Magnesium 1.7 (L) 1.8 - 2.6 mg/dL   Troponin I   Result Value Ref Range    Troponin I 0.02 0.00 - 0.29 ng/mL   Digoxin (Lanoxin )   Result Value Ref Range    Digoxin 0.8 0.5 - 2.0 ng/mL   ECG 12 lead with MUSE   Result Value Ref Range    SYSTOLIC BLOOD PRESSURE  mmHg    DIASTOLIC BLOOD PRESSURE  mmHg    VENTRICULAR RATE 74 BPM    ATRIAL RATE 92 BPM    P-R INTERVAL  ms    QRS DURATION 102 ms    Q-T INTERVAL 380 ms    QTC CALCULATION (BEZET) 421 ms    P Axis  degrees    R AXIS -42 degrees    T AXIS 26 degrees    MUSE DIAGNOSIS       Atrial fibrillation with premature ventricular or aberrantly conducted complexes  Left axis deviation  Abnormal ECG  When compared with ECG of 14-AUG-2017 20:18,  Atrial fibrillation has replaced Sinus rhythm  Confirmed by PEACE KO MD LOC:[facility]INA ([id number]) on 5/20/2018 2:18:28 PM     Urinalysis-UC if Indicated   Result  Value Ref Range    Color, UA Colorless Colorless, Yellow, Straw, Light Yellow    Clarity, UA Clear Clear    Glucose, UA Negative Negative    Bilirubin, UA Negative Negative    Ketones, UA Trace (!) Negative, 60 mg/dL    Specific Gravity, UA 1.019 1.001 - 1.030    Blood, UA Negative Negative    pH, UA 7.0 4.5 - 8.0    Protein, UA Negative Negative mg/dL    Urobilinogen, UA <2.0 E.U./dL <2.0 E.U./dL, 2.0 E.U./dL    Nitrite, UA Negative Negative    Leukocytes, UA Negative Negative   Basic Metabolic Panel   Result Value Ref Range    Sodium 132 (L) 136 - 145 mmol/L    Potassium 4.3 3.5 - 5.0 mmol/L    Chloride 98 98 - 107 mmol/L    CO2 23 22 - 31 mmol/L    Anion Gap, Calculation 11 5 - 18 mmol/L    Glucose 80 70 - 125 mg/dL    Calcium 9.2 8.5 - 10.5 mg/dL    BUN 14 8 - 28 mg/dL    Creatinine 0.88 0.70 - 1.30 mg/dL    GFR MDRD Af Amer >60 >60 mL/min/1.73m2    GFR MDRD Non Af Amer >60 >60 mL/min/1.73m2   HM2(CBC w/o Differential)   Result Value Ref Range    WBC 6.4 4.0 - 11.0 thou/uL    RBC 4.02 (L) 4.40 - 6.20 mill/uL    Hemoglobin 11.4 (L) 14.0 - 18.0 g/dL    Hematocrit 35.7 (L) 40.0 - 54.0 %    MCV 89 80 - 100 fL    MCH 28.4 27.0 - 34.0 pg    MCHC 31.9 (L) 32.0 - 36.0 g/dL    RDW 17.7 (H) 11.0 - 14.5 %    Platelets 204 140 - 440 thou/uL    MPV 10.1 8.5 - 12.5 fL   Magnesium   Result Value Ref Range    Magnesium 1.8 1.8 - 2.6 mg/dL   Thyroid Stimulating Hormone (TSH)   Result Value Ref Range    TSH 2.53 0.30 - 5.00 uIU/mL   Basic Metabolic Panel   Result Value Ref Range    Sodium 131 (L) 136 - 145 mmol/L    Potassium 4.1 3.5 - 5.0 mmol/L    Chloride 99 98 - 107 mmol/L    CO2 22 22 - 31 mmol/L    Anion Gap, Calculation 10 5 - 18 mmol/L    Glucose 147 (H) 70 - 125 mg/dL    Calcium 9.3 8.5 - 10.5 mg/dL    BUN 14 8 - 28 mg/dL    Creatinine 0.88 0.70 - 1.30 mg/dL    GFR MDRD Af Amer >60 >60 mL/min/1.73m2    GFR MDRD Non Af Amer >60 >60 mL/min/1.73m2         Assessment:  1. Left renal mass     2. Generalized weakness     3.  Hydronephrosis, left     4. Protein calorie malnutrition     5. Pedal edema     6. Paroxysmal atrial fibrillation     7. Cerebrovascular accident (CVA) due to embolism of cerebral artery     8. Neuropathy     9. Hypotension     10. Prostatic hypertrophy     11. 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 Atopic dermatitis     13. Insomnia         Plan: I discussed with him and his daughter and son the diagnosis.  He apparently was having a post void residual greater than 300 and of course there was concerns about obstruction but it is more likely due to the significant prostate enlargement.  His low blood pressures could be due to his overall debility.  He was approximately 11 pounds heavier 1 year prior and this may all be due to his wasting from the malignancy.  Because of this I am going to discontinue the metoprolol tartrate continue him on the digoxin as this is been very helpful to prevent his tachycardia.  We will be available to hospice and they will notify us if his blood pressure goes up with parameters to include notifying if the systolic is greater than 160 less than 100 heart rate greater than 90 or less than 50.  Additionally pharmacy was concerned about his using Benadryl.  He used it 4 times a day for years and always wears a breast binder across his chest so he does not have a rash.  This is been very effective and he stopped the 4 time a day Benadryl.  But he is quite habituated to using it at night for sleep.  We discussed switching to melatonin he was going to think about this but both he and his family is advocated no change at this time.  I am in this hospice situation certainly not going to push this    Total 45 minutes of which 65% was spent in counseling and coordination of care of the above plan.    Electronically signed by: Elio Chowdhury MD

## 2021-06-18 NOTE — PROGRESS NOTES
Code Status:  DNR/DNI  Visit Type: Problem Visit (End-of-life care/insomnia/pruritus)     Facility:  [facility] [[id number]]        Facility Type:  (Long Term Care, LTC)    History of Present Illness: Ethan Martin is a 89 y.o. male with known metastatic renal carcinoma who for several days had significant downturn where he was less responsive less eating.  Prior to that time however he had some severe pruritus from his atopic dermatitis and we had afforded him a Kenalog injection.  He reported that each day over 3 day.  It got significantly better now it is completely gone so much so that he does not need the creams that he is to use.  He still wearing his breast binder.  He also reported as did the hospice team that he is a little bit more awake and alert now.  Unfortunately the patient has had some insomnia despite getting Ativan at at bedtime as well as Benadryl.  He is getting Haldol regularly for his nausea and has not had any recently.    Review of Systems     Physical Exam   Constitutional:   Patient is surprisingly alert interactive and absolutely pain-free.  Not complaining of any nausea.  He however seem to be more confused than usual needing to ask me the same question over and over again and relying on his adult son to clarify instructions.  He was not ambulatory and did speak of his profound weakness.   Vitals reviewed.      Labs:  All labs reviewed in the nursing home record.    Assessment:  1. Atopic dermatitis     2. Pruritus     3. Nausea     4. Insomnia         Plan: Long discussion with both hospice nurse and patient and patient's son.  There is a very real chance that his insomnia is being exacerbated by the Haldol.  For this reason we will discontinue the Haldol and begin Reglan 10 mg every 6 hours.  Additionally the Ativan will be 0.5 at at bedtime and every 4 hours as needed.  His pain meds are as needed and he is really not needing them.  He did develop a bedsore and  this is been addressed with the Mepilex.      35 minutes spent of which greater than 65% was face to face communication with the patient about above plan of care    Electronically signed by: Elio Chowdhury MD

## 2021-06-18 NOTE — PROGRESS NOTES
S: Patient was seen at [facility] for the fitting of bilateral carbon fiber AFOs for the treatment of bilateral drop foot on orders from Dr. Cabral.    O: Patient presented supine is his bed at the time of my arrival. Patient was not in a good mood and was not happy about being fit for AFOs at this time.    A: I sized the patient for an Ryanne Blue Rocker 2.0 Carbon fiber AFO, size XL. Patient refused to go through the fitting process and refused to accept the AFO as he wants to still walk with no shoes.     P: I spoke with the patient's nurse and let her know that if the patient changes his mind that he can be fit at a later date.

## 2021-06-18 NOTE — PROGRESS NOTES
Code Status:  DNR/DNI  Visit Type: Problem Visit (Atopic dermatitis/pruritus)     Facility:  [facility] [[id number]]        Facility Type:  (Long Term Care, LTC)    History of Present Illness: Ethan Martin is a 89 y.o. male who recently was found to have a renal cell cancer and wide metastasis abdominally.  He has had for many years a very profound case of atopic dermatitis with significant pruritus and at times severe stab-like pain along his skin with touching.  This neurodermatitis has recently flared.  In the past he has received steroid injection for this.  At the current time he is suffering from this and his topical triamcinolone cream is not efficacious.    Review of Systems     Physical Exam   Constitutional:   Patient is at his baseline presentation.  He has the prominent umbilical hernia that has been discussed prior and just touching very gently along his abdomen causes him pain and discomfort.  There is really no rash or skin change just the profound sensitivity.  He wears over his chest a chest binder we removed this and he reports that just removing it causes discomfort along his skin and I do not see any lesion there either   Vitals reviewed.      Labs:  All labs reviewed in the nursing home record.    Assessment:  1. Atopic dermatitis     2. Pruritus         Plan: We will give Kenalog 60 mg IM immediately in hopes that this will calm this.  At this juncture we will ask for follow-up based on results in we will contact his dermatologist if this is not successful      25 minutes spent of which greater than 65% was face to face communication with the patient about above plan of care    Electronically signed by: Elio Chowdhury MD

## 2021-06-19 NOTE — PROGRESS NOTES
"Code Status:  DNR/DNI  Visit Type: Problem Visit     Facility:  [facility] [[id number]]        Facility Type:  (Long Term Care, LTC)    History of Present Illness: Ethan Martin is a 89 y.o. male who is a hospice patient due to metastatic left renal mass. He is in good spirits with mild agitation and is making jokes with myself and the nursing staff. Today he is concerned about how long the podiatrist is taking to get to his floor. He is is also concerned about a miscommunication with benadryl and ativan at night time. We will clarify this today. He states he is feeling fine and he's \"waiting for symptoms so I can die\"- he is feeling well despite his poor prognosis. He reports his appetite is great, possibly due to kenalog, which he is very happy with as his itching is still abated since injection. Sleep is going well.     Review of Systems:   Per HPI. He is feeling well and denying concerns.     Physical Exam   General appearance: alert, appears stated age and cooperative; bright affect with some agitation, although humorous.   Head: Normocephalic, without obvious abnormality, atraumatic, Eyes: anicteric.  Lungs: respirations without effort, LS CTA  Cardiovascular: S1, S2.  ABDOMEN: With herniation he attributes to surgical error.   Extremities: extremities normal, atraumatic, no cyanosis or edema  Skin: Skin color, texture, turgor normal. No rashes or lesions  Neurologic: oriented. No focal deficits.   Psych: interacts well with caregivers, exhibits logical thought processes and connections, pleasant    Labs:  All labs reviewed in the nursing home record.    Assessment:  1. Renal mass     2. Moderate protein-calorie malnutrition (H)     3. Atopic dermatitis         Plan:   1.  He continues on hospice, we will clarify with nursing that he can have a second 25 mg Benadryl in the overnight if needed with his Ativan.  This is all previously ordered by Dr. Chowdhury and hospice and I am just " clarifying this for the night nurse specifically.  2.  He reports a improved appetite, his weights have been stable since May, and he is comfortable.  3.  No rash noted on exam, he denies itching, he is comfortable.  If itching becomes an issue again, it has been 4 weeks since first injection so he may have a second when needed.    MCS to continue to follow.   25 minutes spent of which greater than 65% was face to face communication with the patient about above plan of care    Seen and dictated by Heidy Lowe CNP  Electronically signed by: Gudelia Lynn CNP

## 2021-06-19 NOTE — PROGRESS NOTES
Code Status:  DNR/DNI  Visit Type: Problem Visit (Hematuria)     Facility:  [facility] [[id number]]        Facility Type:  (Long Term Care, LTC)    History of Present Illness: Ethan Martin is a 89 y.o. male who is a hospice patient with known renal cell carcinoma.  Most recently he has had bright red blood which was believed to be per penis.  He has had no pain with tests there is been no shortening of stream.  He has not had any difficulties with his itching his anxiety is been well controlled.  He has also been sleeping better.  His major complaint is he wants to die and it is not happening.    Review of Systems     Physical Exam   Constitutional:   Patient appears less anxious than usual his lungs are clear there is no flank area tenderness right or left and there is no suprapubic area tenderness.   Vitals reviewed.      Labs:  All labs reviewed in the nursing home record.    Assessment:  1. Left renal mass     2. Hematuria         Plan: We discussed at length his recent hematuria and the very real possibility would be related to his renal cell carcinoma.  There are greatest concern with this is obstruction and I did encourage him to drink fluids to keep his urinary flow.  We discussed end-of-life issues and I do feel that he is becoming more comfortable with his process.      25 minutes spent of which greater than 65% was face to face communication with the patient about above plan of care    Electronically signed by: Elio Chowdhury MD

## 2021-06-19 NOTE — PROGRESS NOTES
"[facility]    Facility:   [facility] [[id number]]   Code Status: DNR/DNI      CHIEF COMPLAINT/REASON FOR VISIT:  Chief Complaint   Patient presents with     Review Of Multiple Medical Conditions       HISTORY:      HPI: Ethan is a 89 y.o. male who has a terminal renal cell carcinoma.  It was progressing and he was declining and then recently he is rebounded.  Initially he had become very comfortable with the notion of dying and then when he got better he was actually disturbed that he was getting better and did not really understand why.  In addition he had wanted Benadryl in addition to the Ativan with his nighttime dosing and felt that he was not getting it in adequate time.  He denies any pain and is denies any itching from his very severe atopic dermatitis after our IM injection.    Past Medical History:   Diagnosis Date     Altered mental state      Bowel obstruction      CVA (cerebral infarction)      Dermatitis     Avila following this condition     Gastric outlet obstruction      GERD (gastroesophageal reflux disease)      Hematemesis      Hernia      HLD (hyperlipidemia)      HTN (hypertension)      Influenza      Migraine      Neuropathy (H)      Normocytic anemia      Paroxysmal atrial fibrillation (H)      Peptic ulcer disease      Psoriasis      Pyloric stenosis      Stroke (H)              Family History   Problem Relation Age of Onset     No Medical Problems Mother      No Medical Problems Father      Social History     Social History     Marital status:      Spouse name: N/A     Number of children: N/A     Years of education: N/A     Social History Main Topics     Smoking status: Never Smoker     Smokeless tobacco: Never Used     Alcohol use No      Comment: one \"hi-ball\" a month     Drug use: No     Sexual activity: Not Currently     Other Topics Concern     Not on file     Social History Narrative         Review of Systems    .  Vitals:    07/20/18 " 1421   BP: 126/79   Pulse: 80   Weight: 154 lb (69.9 kg)       Physical Exam   Constitutional:   Very alert and interactive at times slightly anxious in the assertive but this is his baseline.  His lungs are clear his vital signs have been normal heart is regular rate.  There is no significant edema.  His rash does not appear to be problematic.   Vitals reviewed.        LABS:       ASSESSMENT:      ICD-10-CM    1. Renal mass N28.89    2. Hospice care Z51.5    3. Insomnia G47.00        PLAN:    We spent quite a bit of time just discussing the course of his illness and that it may not have a chronic and rapid progression and unfortunately he will have to adjust to either condition.  I will make sure that he gets his dose of Benadryl second 125 mg writing that he can have at his soonest 2 hours after the first 1.  No other changes at this time.    Total 30 minutes of which 65% was spent counseling and coordination of care of the above plan.    Electronically signed by: Elio Chowdhury MD

## 2021-06-19 NOTE — PROGRESS NOTES
Code Status:  DNR/DNI  Visit Type: Problem Visit (Patient )     Facility:  [facility] [[id number]]        Facility Type:  (Long Term Care, LTC)    History of Present Illness: Ethan Martin is a 89 y.o. male I was called as he had just been examined and thought to passed away.  His son was in the room at the time.    Review of Systems     Physical Exam   Constitutional:   Patient is without respirations or heartbeat.       Labs:  All labs reviewed in the nursing home record.    Assessment:  1. Left renal mass     2. Hospice care         Plan: Patient was pronounced  at 950.  I visited with his son and daughter and we discussed arrangements and his passing in his process of passing.  I gave him guidance on anticipatory emotions that will arise for them and we discussed our process.  I also stated I would be available for them if they needed me to recheck to them.      15 minutes spent of which greater than 65% was face to face communication with the patient about above plan of care    Electronically signed by: Elio Chowdhury MD

## 2021-06-25 NOTE — PROGRESS NOTES
Progress Notes by Delfino Winters NP at 1/10/2017 10:49 AM     Author: Delfino Winters NP Service: -- Author Type: Nurse Practitioner    Filed: 1/10/2017  2:42 PM Encounter Date: 1/10/2017 Status: Signed    : Elio Chowdhury MD (Physician)       Code Status:  FULL CODE  Visit Type: H & P     Facility:  [facility] [[id number]]      Facility Type: SNF (Skilled Nursing Facility, TCU)    History of Present Illness:   Hospital Admission Date: 1/5/2017  [facility]  Hospital Discharge Date: 1/8/2017  Facility Admission Date: 1/8/2017    Ethan Martin is a 89 y.o. male with PMH of CVA, Gastric Outlet Obstruction, GERD, Hyperlipidemia, HTN, PUD, Atrial fibrillation, CVA, and Psoriasis who presented to [facility] ED due to weakness and fall at home. Patient was found to have influenza A and was admitted to evaluation and further management at [facility]. Patient was previously admitted from 12/26/2016-12/28/2016 for generalized weakness and diarrhea. Prior to that he was admitted at [facility] between 12/21/2016-12/24/2016 due to small bowel obstruction. [facility] physical deconditioning and went home on the 1/4/2017 and the fall incident happened on the 1/5/2017 and presented to ED.    Per discharge note, patient reported having cough and developed progressive weakness which lead him to fell and couldn't get up and crawl to the phone to call his daughter and daughter called EMS and patient was brought to ED for evaluation. Patient has a abrasion on right elbow and left elbow . Patient denied having fever or chills but did have diarrhea, N/V. He denied feeling chest pain, palpitation, SOB, light headed, dizziness, loss of consciousness before and after the fall. Patient was started on Oseltamivir 75 mg daily and treated with IVF for hydration, O2 supplement, and Dextromethorpha-guaifenesin for cough. Patient was discharged here to TCU you for PT/OT and  strengthening. Patient's CK level on 1/6 was 208 and recheck on 1/8 was 152. Patient's Mg level was 1.7.    Patient was found in his wheelchair during the visit. He reported that he is feeling much better except had one loose stool yesterday. He is still having dry cough but denies feeling sore throat, N/V, fever, chills or sinus pressure. In fact, patient said, he wants to go home. He doesn't like the food taste and want to be at home and he is feeling very frustrated here. Spoke with PT. Per PT, patient has done minimal walk in the room and transferred from wheelchair to bed and to the bathroom and patient is found very unsteady with his balance and weak. PT recommended at least another week of therapy and re-evaluate if he can be discharged. Discussed with the patient about his therapy status and patient agreed to give us another week to get him stronger. We have also discussed about the continuation of flu therapy treatment per the institute protocol. He has no other concerns or issues at this time.    Past Medical History   Diagnosis Date   ? Bowel obstruction    ? CVA (cerebral infarction)    ? Dermatitis      Avila following this condition   ? Gastric outlet obstruction    ? GERD (gastroesophageal reflux disease)    ? Hematemesis    ? Hernia    ? HLD (hyperlipidemia)    ? HTN (hypertension)    ? Influenza    ? Migraine    ? Neuropathy    ? Normocytic anemia    ? Paroxysmal atrial fibrillation    ? Peptic ulcer disease    ? Psoriasis    ? Pyloric stenosis    ? Stroke      Past Surgical History   Procedure Laterality Date   ? Cholecystectomy     ? Pyloroplasty     ? Back surgery     ? Esophagogastroduodenoscopy N/A 6/24/2015     Procedure: ESOPHAGOGASTRODUODENOSCOPY with biopsy and esophageal dilitation;  Surgeon: Yohannes Lawton MD;  Location: [facility];  Service:      Family History   Problem Relation Age of Onset   ? No Medical Problems Mother    ? No Medical Problems Father      Social History     Social  History   ? Marital status:      Spouse name: N/A   ? Number of children: N/A   ? Years of education: N/A     Occupational History   ? Not on file.     Social History Main Topics   ? Smoking status: Never Smoker   ? Smokeless tobacco: Never Used   ? Alcohol use No   ? Drug use: No   ? Sexual activity: Not Currently     Other Topics Concern   ? Not on file     Social History Narrative     Additional Geriatric Review: Patient is a  and he lives alone . He's been living in the same home since 1950 he has one daughter who takes care of getting him around and to events and with ADLs. He has three  boys who live in the area and are involved in his care. He is a retired  from [employer]. He quit driving around 2011.     Current Outpatient Prescriptions   Medication Sig Dispense Refill   ? aspirin 81 MG EC tablet Take 81 mg by mouth every evening.     ? aspirin-acetaminophen-caffeine (EXCEDRIN MIGRAINE) 250-250-65 mg per tablet Take 1 tablet by mouth every 8 (eight) hours as needed for pain.     ? atorvastatin (LIPITOR) 10 MG tablet Take 10 mg by mouth bedtime.      ? dextromethorphan-guaiFENesin (ROBITUSSIN-DM)  mg/5 mL liquid Take 5 mL by mouth every 4 (four) hours as needed.  0   ? DIGOX 250 mcg tablet Take 250 mcg by mouth every other day. Every OTHER day     ? diphenhydrAMINE (BENADRYL) 50 MG capsule Take 50 mg by mouth bedtime. sleep     ? EMOLLIENT BASE (VANICREAM TOP) Apply 1 application topically 2 (two) times a day as needed. Mixes with triamcinolone and usually uses at least once daily in the morning     ? metoclopramide (REGLAN) 5 MG tablet Take 1 tablet (5 mg total) by mouth 3 (three) times a day as needed for nausea (hiccup). 20 tablet 0   ? metoprolol tartrate (LOPRESSOR) 25 MG tablet Take 1 tablet (25 mg total) by mouth 2 (two) times a day. 60 tablet 1   ? omeprazole (PRILOSEC) 20 MG capsule Take 20 mg by mouth 2 (two) times a day.      ? oseltamivir (TAMIFLU) 75 MG capsule Take 1  capsule (75 mg total) by mouth 2 (two) times a day for 4 days. 10 capsule 0   ? ramipril (ALTACE) 10 MG capsule Take 10 mg by mouth daily.      ? tamsulosin (FLOMAX) 0.4 mg Cp24 Take 1 capsule (0.4 mg total) by mouth daily after supper. 30 capsule 0   ? triamcinolone (KENALOG) 0.1 % cream Apply 1 application topically 2 (two) times a day as needed (rash on arm(s)). Do not use for more than 1 week at a time.  Using on chest, arms, hands. Mixes with Vanicream and usually uses at least once daily in the morning       No current facility-administered medications for this visit.      No Known Allergies    There is no immunization history on file for this patient.      Review of Systems   Constitutional: Negative for activity change, chills, diaphoresis, fatigue and fever.   HENT: Negative for ear pain, hearing loss, sinus pressure, sore throat and trouble swallowing.    Eyes: Negative for photophobia, pain, discharge, redness and visual disturbance.   Respiratory: Negative for cough, chest tightness, shortness of breath and wheezing.    Cardiovascular: Positive for leg swelling. Negative for chest pain and palpitations.   Gastrointestinal: Positive for diarrhea. Negative for abdominal pain, constipation, nausea and vomiting.   Endocrine: Negative for cold intolerance and heat intolerance.   Genitourinary: Negative for difficulty urinating, dysuria, flank pain, frequency and urgency.   Musculoskeletal: Negative for arthralgias, back pain, myalgias, neck pain and neck stiffness.   Skin:        Bilateral elbow wound from fall   Allergic/Immunologic: Negative for immunocompromised state.   Neurological: Negative for dizziness, tremors, seizures, syncope, facial asymmetry, speech difficulty, weakness, light-headedness, numbness and headaches.   Psychiatric/Behavioral: Negative for agitation, behavioral problems, confusion, dysphoric mood and sleep disturbance. The patient is not nervous/anxious.           Physical Exam    Constitutional: He is oriented to person, place, and time. He appears well-developed and well-nourished. No distress.   HENT:   Head: Normocephalic and atraumatic.   Right Ear: External ear normal.   Left Ear: External ear normal.   Nose: Nose normal.   Mouth/Throat: Oropharynx is clear and moist. No oropharyngeal exudate.   Eyes: Conjunctivae and EOM are normal. Pupils are equal, round, and reactive to light. Right eye exhibits no discharge. Left eye exhibits no discharge. No scleral icterus.   Neck: Neck supple. No JVD present. No tracheal deviation present. No thyromegaly present.   Cardiovascular: Regular rhythm and normal heart sounds.    Afib  Weak pedal pulse   Pulmonary/Chest: Effort normal and breath sounds normal. No respiratory distress. He has no wheezes. He has no rales. He exhibits no tenderness.   Abdominal: He exhibits no distension and no mass. There is no tenderness. There is no guarding.   Genitourinary:   Genitourinary Comments: Denies urinary symptoms   Musculoskeletal: Normal range of motion.   Edema in legs (rt >lt). Able to move his extremities.   Lymphadenopathy:     He has no cervical adenopathy.   Neurological: He is alert and oriented to person, place, and time. No cranial nerve deficit.   Skin: Skin is warm and dry. No rash noted. He is not diaphoretic. No erythema.        Abrasion X 2 on left elbow.  Abrasion X 1 on right elbow  Skin tear X 1 on right elbow  Skin excoriation on bilateral buttock   Psychiatric: He has a normal mood and affect. His behavior is normal. Thought content normal.       Labs:    Lab Results   Component Value Date    DIGOXIN 0.4 (L) 12/21/2016     Recent Results (from the past 240 hour(s))   ECG 12 lead nursing unit performed   Result Value Ref Range    SYSTOLIC BLOOD PRESSURE  mmHg    DIASTOLIC BLOOD PRESSURE  mmHg    VENTRICULAR RATE 112 BPM    ATRIAL RATE 112 BPM    P-R INTERVAL 190 ms    QRS DURATION 96 ms    Q-T INTERVAL 314 ms    QTC CALCULATION (BEZET)  428 ms    P Axis 66 degrees    R AXIS -44 degrees    T AXIS 65 degrees    MUSE DIAGNOSIS       Sinus tachycardia with frequent and consecutive Premature ventricular complexes  Left axis deviation  Abnormal ECG  When compared with ECG of 26-DEC-2016 11:45,  No significant change was found  Confirmed by LUDWIG MORENO MD LOC: ([id number]) on 1/6/2017 2:16:15 PM     HM2(CBC w/o Differential)   Result Value Ref Range    WBC 8.3 4.0 - 11.0 thou/uL    RBC 4.77 4.40 - 6.20 mill/uL    Hemoglobin 13.1 (L) 14.0 - 18.0 g/dL    Hematocrit 41.6 40.0 - 54.0 %    MCV 87 80 - 100 fL    MCH 27.5 27.0 - 34.0 pg    MCHC 31.5 (L) 32.0 - 36.0 g/dL    RDW 17.9 (H) 11.0 - 14.5 %    Platelets 204 140 - 440 thou/uL    MPV 11.0 8.5 - 12.5 fL   Basic Metabolic Panel   Result Value Ref Range    Sodium 130 (L) 136 - 145 mmol/L    Potassium 4.6 3.5 - 5.0 mmol/L    Chloride 101 98 - 107 mmol/L    CO2 21 (L) 22 - 31 mmol/L    Anion Gap, Calculation 8 5 - 18 mmol/L    Glucose 115 70 - 125 mg/dL    Calcium 9.7 8.5 - 10.5 mg/dL    BUN 12 8 - 28 mg/dL    Creatinine 0.86 0.70 - 1.30 mg/dL    GFR MDRD Af Amer >60 >60 mL/min/1.73m2    GFR MDRD Non Af Amer >60 >60 mL/min/1.73m2   CK   Result Value Ref Range    CK, Total 198 (H) 30 - 190 U/L   Influenza A/B Rapid Test   Result Value Ref Range    Influenza  A, Rapid Antigen Influenza A antigen detected (!) No Influenza A antigen detected    Influenza B, Rapid Antigen No Influenza B antigen detected No Influenza B antigen detected   Lactic Acid   Result Value Ref Range    Lactic Acid 1.0 0.5 - 2.2 mmol/L   Urinalysis-UC if Indicated   Result Value Ref Range    Color, UA Yellow Colorless, Yellow, Straw, Light Yellow    Clarity, UA Clear Clear    Glucose, UA Negative Negative    Bilirubin, UA Negative Negative    Ketones, UA Trace (!) Negative    Specific Gravity, UA 1.015 1.001 - 1.030    Blood, UA Negative Negative    pH, UA 5.5 4.5 - 8.0    Protein, UA Trace (!) Negative mg/dL    Urobilinogen, UA <2.0 E.U./dL  <2.0 E.U./dL, 2.0 E.U./dL    Nitrite, UA Negative Negative    Leukocytes, UA Negative Negative    Bacteria, UA None Seen None Seen hpf    RBC, UA 0-2 None Seen, 0-2 hpf    WBC, UA 0-5 None Seen, 0-5 hpf    Squam Epithel, UA 0-5 None Seen, 0-5 lpf    Mucus, UA Moderate (!) None Seen lpf   Basic metabolic panel   Result Value Ref Range    Sodium 133 (L) 136 - 145 mmol/L    Potassium 4.5 3.5 - 5.0 mmol/L    Chloride 104 98 - 107 mmol/L    CO2 21 (L) 22 - 31 mmol/L    Anion Gap, Calculation 8 5 - 18 mmol/L    Glucose 94 70 - 125 mg/dL    Calcium 8.7 8.5 - 10.5 mg/dL    BUN 11 8 - 28 mg/dL    Creatinine 0.73 0.70 - 1.30 mg/dL    GFR MDRD Af Amer >60 >60 mL/min/1.73m2    GFR MDRD Non Af Amer >60 >60 mL/min/1.73m2   CK Total   Result Value Ref Range    CK, Total 208 (H) 30 - 190 U/L   Basic metabolic panel   Result Value Ref Range    Sodium 133 (L) 136 - 145 mmol/L    Potassium 4.0 3.5 - 5.0 mmol/L    Chloride 109 (H) 98 - 107 mmol/L    CO2 18 (L) 22 - 31 mmol/L    Anion Gap, Calculation 6 5 - 18 mmol/L    Glucose 87 70 - 125 mg/dL    Calcium 8.0 (L) 8.5 - 10.5 mg/dL    BUN 11 8 - 28 mg/dL    Creatinine 0.72 0.70 - 1.30 mg/dL    GFR MDRD Af Amer >60 >60 mL/min/1.73m2    GFR MDRD Non Af Amer >60 >60 mL/min/1.73m2   Platelet Count - every other day x 3   Result Value Ref Range    Platelets 148 140 - 440 thou/uL   CK Total   Result Value Ref Range    CK, Total 152 30 - 190 U/L   Renal Function Profile   Result Value Ref Range    Albumin 2.4 (L) 3.5 - 5.0 g/dL    Calcium 7.9 (L) 8.5 - 10.5 mg/dL    Phosphorus 2.5 2.5 - 4.5 mg/dL    Glucose 89 70 - 125 mg/dL    BUN 12 8 - 28 mg/dL    Creatinine 0.69 (L) 0.70 - 1.30 mg/dL    Sodium 133 (L) 136 - 145 mmol/L    Potassium 4.0 3.5 - 5.0 mmol/L    Chloride 110 (H) 98 - 107 mmol/L    CO2 18 (L) 22 - 31 mmol/L    Anion Gap, Calculation 5 5 - 18 mmol/L    GFR MDRD Af Amer >60 >60 mL/min/1.73m2    GFR MDRD Non Af Amer >60 >60 mL/min/1.73m2   Magnesium   Result Value Ref Range     Magnesium 1.7 (L) 1.8 - 2.6 mg/dL   Basic Metabolic Panel   Result Value Ref Range    Sodium 134 (L) 136 - 145 mmol/L    Potassium 4.1 3.5 - 5.0 mmol/L    Chloride 108 (H) 98 - 107 mmol/L    CO2 20 (L) 22 - 31 mmol/L    Anion Gap, Calculation 6 5 - 18 mmol/L    Glucose 93 70 - 125 mg/dL    Calcium 8.3 (L) 8.5 - 10.5 mg/dL    BUN 8 8 - 28 mg/dL    Creatinine 0.64 (L) 0.70 - 1.30 mg/dL    GFR MDRD Af Amer >60 >60 mL/min/1.73m2    GFR MDRD Non Af Amer >60 >60 mL/min/1.73m2       Assessment:  1. Influenza A     2. Persistent atrial fibrillation     3. Physical deconditioning     4. Weakness     5. Abrasion of left elbow     6. Fall, sequela     7. History of CVA (cerebrovascular accident)     8. Abrasion of right elbow     9. Hypomagnesemia         Plan:   1. Tamiflu capsule 75 mg daily X 10 days starting from 1/12/2017 per institute protocol.  2. Patient wants to discuss with PCP about starting on anticoagulant for Atrial fibrillation. Currently on Aspirin 81 mg daily.  3. Continue PT/OT and re-evaluate in a week.  4. Use Abd pad and apply wrap for right elbow abrasion (avoid applying tape on skin directly - risk for skin tear). Mepilex on skin tear on right elbow and left elbow.  5. Safety precautions for fall.  6. Apply barrier moisture cream BID and PRN to buttock area for skin excoriation.  7. Recheck Mg+ level on 1/12/2017 and update result.  8. Encourage PO fluid intake          Total 55 minutes of which 65% was spent in counseling and coordination of care of the above plan.    Electronically signed by: YULISSA Giang MD